# Patient Record
Sex: MALE | Race: WHITE | NOT HISPANIC OR LATINO | Employment: FULL TIME | URBAN - METROPOLITAN AREA
[De-identification: names, ages, dates, MRNs, and addresses within clinical notes are randomized per-mention and may not be internally consistent; named-entity substitution may affect disease eponyms.]

---

## 2017-05-18 DIAGNOSIS — Z94.81 S/P ALLOGENEIC BONE MARROW TRANSPLANT (H): ICD-10-CM

## 2017-05-18 DIAGNOSIS — R91.8 PULMONARY NODULES: ICD-10-CM

## 2017-05-18 DIAGNOSIS — D60.9 RED CELL APLASIA (H): ICD-10-CM

## 2017-05-18 LAB
ALBUMIN SERPL-MCNC: 4.2 G/DL (ref 3.4–5)
ALP SERPL-CCNC: 66 U/L (ref 40–150)
ALT SERPL W P-5'-P-CCNC: 31 U/L (ref 0–70)
ANION GAP SERPL CALCULATED.3IONS-SCNC: 9 MMOL/L (ref 3–14)
AST SERPL W P-5'-P-CCNC: 30 U/L (ref 0–45)
BASOPHILS # BLD AUTO: 0 10E9/L (ref 0–0.2)
BASOPHILS NFR BLD AUTO: 0.3 %
BILIRUB SERPL-MCNC: 2.8 MG/DL (ref 0.2–1.3)
BUN SERPL-MCNC: 14 MG/DL (ref 7–30)
CALCIUM SERPL-MCNC: 8.6 MG/DL (ref 8.5–10.1)
CHLORIDE SERPL-SCNC: 105 MMOL/L (ref 94–109)
CO2 SERPL-SCNC: 26 MMOL/L (ref 20–32)
CREAT SERPL-MCNC: 0.82 MG/DL (ref 0.66–1.25)
DIFFERENTIAL METHOD BLD: NORMAL
EOSINOPHIL # BLD AUTO: 0.1 10E9/L (ref 0–0.7)
EOSINOPHIL NFR BLD AUTO: 1.2 %
ERYTHROCYTE [DISTWIDTH] IN BLOOD BY AUTOMATED COUNT: 13.1 % (ref 10–15)
FERRITIN SERPL-MCNC: 732 NG/ML (ref 26–388)
GFR SERPL CREATININE-BSD FRML MDRD: ABNORMAL ML/MIN/1.7M2
GLUCOSE SERPL-MCNC: 96 MG/DL (ref 70–99)
HCT VFR BLD AUTO: 44.2 % (ref 40–53)
HGB BLD-MCNC: 15.6 G/DL (ref 13.3–17.7)
IMM GRANULOCYTES # BLD: 0 10E9/L (ref 0–0.4)
IMM GRANULOCYTES NFR BLD: 0.3 %
LYMPHOCYTES # BLD AUTO: 1.8 10E9/L (ref 0.8–5.3)
LYMPHOCYTES NFR BLD AUTO: 24.3 %
MCH RBC QN AUTO: 32 PG (ref 26.5–33)
MCHC RBC AUTO-ENTMCNC: 35.3 G/DL (ref 31.5–36.5)
MCV RBC AUTO: 91 FL (ref 78–100)
MONOCYTES # BLD AUTO: 0.7 10E9/L (ref 0–1.3)
MONOCYTES NFR BLD AUTO: 9.9 %
NEUTROPHILS # BLD AUTO: 4.7 10E9/L (ref 1.6–8.3)
NEUTROPHILS NFR BLD AUTO: 64 %
NRBC # BLD AUTO: 0 10*3/UL
NRBC BLD AUTO-RTO: 0 /100
PLATELET # BLD AUTO: 155 10E9/L (ref 150–450)
POTASSIUM SERPL-SCNC: 3.8 MMOL/L (ref 3.4–5.3)
PROT SERPL-MCNC: 7.6 G/DL (ref 6.8–8.8)
RBC # BLD AUTO: 4.88 10E12/L (ref 4.4–5.9)
SODIUM SERPL-SCNC: 141 MMOL/L (ref 133–144)
WBC # BLD AUTO: 7.4 10E9/L (ref 4–11)

## 2017-05-18 PROCEDURE — 80053 COMPREHEN METABOLIC PANEL: CPT | Performed by: INTERNAL MEDICINE

## 2017-05-18 PROCEDURE — 99212 OFFICE O/P EST SF 10 MIN: CPT | Mod: 25

## 2017-05-18 PROCEDURE — 82728 ASSAY OF FERRITIN: CPT | Performed by: INTERNAL MEDICINE

## 2017-05-18 PROCEDURE — 90471 IMMUNIZATION ADMIN: CPT

## 2017-05-18 PROCEDURE — 85025 COMPLETE CBC W/AUTO DIFF WBC: CPT | Performed by: INTERNAL MEDICINE

## 2017-05-19 ENCOUNTER — ONCOLOGY VISIT (OUTPATIENT)
Dept: TRANSPLANT | Facility: CLINIC | Age: 27
End: 2017-05-19
Attending: INTERNAL MEDICINE
Payer: COMMERCIAL

## 2017-05-19 VITALS
RESPIRATION RATE: 18 BRPM | OXYGEN SATURATION: 100 % | HEART RATE: 89 BPM | DIASTOLIC BLOOD PRESSURE: 72 MMHG | WEIGHT: 192.1 LBS | BODY MASS INDEX: 26.05 KG/M2 | TEMPERATURE: 98.3 F | SYSTOLIC BLOOD PRESSURE: 121 MMHG

## 2017-05-19 DIAGNOSIS — Z94.81 S/P ALLOGENEIC BONE MARROW TRANSPLANT (H): Primary | ICD-10-CM

## 2017-05-19 PROCEDURE — 25000125 ZZHC RX 250: Mod: ZF | Performed by: INTERNAL MEDICINE

## 2017-05-19 PROCEDURE — 90651 9VHPV VACCINE 2/3 DOSE IM: CPT | Mod: ZF | Performed by: INTERNAL MEDICINE

## 2017-05-19 RX ADMIN — HUMAN PAPILLOMAVIRUS 9-VALENT VACCINE, RECOMBINANT 0.5 ML: 30; 40; 60; 40; 20; 20; 20; 20; 20 INJECTION, SUSPENSION INTRAMUSCULAR at 12:32

## 2017-05-19 ASSESSMENT — PAIN SCALES - GENERAL: PAINLEVEL: NO PAIN (0)

## 2017-05-19 NOTE — PATIENT INSTRUCTIONS
He is now 2 yr from completing brentuximab; I will discuss with Dr Morrison follow up frequency from here on and give him a call with plan.  CT neck CAP w and w/o contrast in 6 months  Gardasil 2nd dose today; next dose in OCT 2017; 3rd dose on 10/20/17 in the system

## 2017-05-19 NOTE — PROGRESS NOTES
BMT Clinic Progress Note    Amadou is a 25 yo s/p NMA allo sib PBSCT for Hodgkin lymphoma, with relapsed disease, on brentuximab with complete response to therapy based on PET/CT findings. S/p 16 cycles of brentuximab completed MAY 2015.  CC: Follow-up     HPI:  Patient returns for follow-up. Resolved lung infiltrates. Still on vfend. Remains asymptomatic. Full time job going well. Got engaged. Eats well. Weight stable. No fevers or sweats. Usual runny nose. Reports neuropathy stable. Denied fever/chills. No night sweats. No weight loss.     ROS: 10 point ROS otherwise unremarkable other than what is noted in the HPI.   Physical Exam:      Vitals:    05/19/17 1136   BP: 121/72   BP Location: Left arm   Patient Position: Chair   Cuff Size: Adult Regular   Pulse: 89   Resp: 18   Temp: 98.3  F (36.8  C)   TempSrc: Oral   SpO2: 100%   Weight: 87.1 kg (192 lb 1.6 oz)     Wt Readings from Last 4 Encounters:   05/19/17 87.1 kg (192 lb 1.6 oz)   10/06/16 83.1 kg (183 lb 1.6 oz)   09/07/16 83.7 kg (184 lb 8.4 oz)   08/18/16 83.2 kg (183 lb 6.4 oz)     HEENT: PERRL, EOMI, MMM. Mouth little dry but no definite changes of GVHD.  Neck with no palpable nodes or masses. Range of motion seems intact.  Chest: CTAB  CVS: S1S2 RRR, no murmurs or gallops  Abdomen : soft non tender, no hepatosplenomegaly  EXt no edema  CNS: non focal  Ear with plugs of wax both sides  Lab Results   Component Value Date    WBC 7.4 05/18/2017    ANEU 4.7 05/18/2017    HGB 15.6 05/18/2017    HCT 44.2 05/18/2017     05/18/2017     05/18/2017    POTASSIUM 3.8 05/18/2017    CHLORIDE 105 05/18/2017    CO2 26 05/18/2017    GLC 96 05/18/2017    BUN 14 05/18/2017    CR 0.82 05/18/2017    MAG 1.8 04/10/2015    INR 1.14 01/22/2016     Lab Results   Component Value Date    AST 30 05/18/2017     Lab Results   Component Value Date    ALT 31 05/18/2017     Lab Results   Component Value Date    BILICONJ 0.0 03/11/2014      Lab Results   Component Value Date     BILITOTAL 2.8 05/18/2017     Lab Results   Component Value Date    ALBUMIN 4.2 05/18/2017     Lab Results   Component Value Date    PROTTOTAL 7.6 05/18/2017      Lab Results   Component Value Date    ALKPHOS 66 05/18/2017       Ferritin 700's    CT Neck, CAP w and w/o contrast Nov/16.  NO evidence of relapsed Hodgkin's lymphoma    Assessment and Plan:      Amadou is a 25yo with a h/o Hodgkins lymphoma, s/p NMA allo sib, relapsed; S/P brentuximab 16 cycles.     1. Hodgkin's lymphoma: in CR by PET after 16 of brentuximab completed in May 2015. Neuropathy unchanged.   2. BMT: s/p NMA allo sib PBSCT for Hodgkin lymphoma, protocol 2001-10. Previous auto PBSCT in August 2012. Relapsed 5/6/14. In CR as of Nov 2016 CT again as above.   3. Heme:  Hemosiderosis. Ferritin up to 700's. No need to restart phlebotomies at this time unless elevated ferritin raises again. Hemochromatosis testing shows for C282Y and S65C absent and heterozygous H63D Mutation. Exjade caused profound neutropenia when given during brentuximab therapy.   4. ID: no acitve infectious issues. Flu shot in the fall when available. today. Also got vaccines for Pnemococcus, MMR, varicella. Gardasil 2nd dose today, 3rd in OCT 17 in [facility].   - completed vaccination today, gardasil was 2nd dose. IgG and CD4 counts were good in sep 2015  5. Liver: normal LFts. Off phlebotomies.  - Gilbert's: Intermittently has elevated bilirubin. Bilirubin slightly elevated today  6. FEN: Creat, lytes wnl. Eating well.   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 Discontinued Gengraf 2/11/14  8. Arachnoid cyst: accidental finding; likely benign; following with neurosurgery    Plan:   He is now 2 yr from completing brentuximab; I will discuss with Dr Morrison follow up frequency from here on and give him a call with plan.  CT neck CAP w and w/o contrast in 6 months  Gardasil 2nd dose today; next dose in OCT 2017; 3rd dose on 10/20/17 in the system.

## 2017-05-19 NOTE — NURSING NOTE
Oncology Rooming Note    May 19, 2017 11:44 AM   Amadou Landrum is a 26 year old male who presents for:    Chief Complaint   Patient presents with     Oncology Clinic Visit     Patient with Hodgkin's disease here for provider visit      Initial Vitals: /72 (BP Location: Left arm, Patient Position: Chair, Cuff Size: Adult Regular)  Pulse 89  Temp 98.3  F (36.8  C) (Oral)  Resp 18  Wt 87.1 kg (192 lb 1.6 oz)  SpO2 100%  BMI 26.05 kg/m2 Estimated body mass index is 26.05 kg/(m^2) as calculated from the following:    Height as of 8/18/16: 1.829 m (6').    Weight as of this encounter: 87.1 kg (192 lb 1.6 oz). Body surface area is 2.1 meters squared.  No Pain (0) Comment: Data Unavailable   No LMP for male patient.  Allergies reviewed: Yes  Medications reviewed: Yes    Medications: Medication refills not needed today.  Pharmacy name entered into [employer]:    [facility] - [place], [place] - [address] [id number] - [place], [place] - [address]     Clinical concerns:   5 minutes for nursing intake (face to face time)     Kimberly French CMA

## 2017-05-19 NOTE — LETTER
5/19/2017      RE: Amadou Landrum  [address] [place] [address]       BMT Clinic Progress Note    Amadou is a 23 yo s/p NMA allo sib PBSCT for Hodgkin lymphoma, with relapsed disease, on brentuximab with complete response to therapy based on PET/CT findings. S/p 16 cycles of brentuximab completed MAY 2015.  CC: Follow-up     HPI:  Patient returns for follow-up. Resolved lung infiltrates. Still on vfend. Remains asymptomatic. Full time job going well. Got engaged. Eats well. Weight stable. No fevers or sweats. Usual runny nose. Reports neuropathy stable. Denied fever/chills. No night sweats. No weight loss.     ROS: 10 point ROS otherwise unremarkable other than what is noted in the HPI.   Physical Exam:      Vitals:    05/19/17 1136   BP: 121/72   BP Location: Left arm   Patient Position: Chair   Cuff Size: Adult Regular   Pulse: 89   Resp: 18   Temp: 98.3  F (36.8  C)   TempSrc: Oral   SpO2: 100%   Weight: 87.1 kg (192 lb 1.6 oz)     Wt Readings from Last 4 Encounters:   05/19/17 87.1 kg (192 lb 1.6 oz)   10/06/16 83.1 kg (183 lb 1.6 oz)   09/07/16 83.7 kg (184 lb 8.4 oz)   08/18/16 83.2 kg (183 lb 6.4 oz)     HEENT: PERRL, EOMI, MMM. Mouth little dry but no definite changes of GVHD.  Neck with no palpable nodes or masses. Range of motion seems intact.  Chest: CTAB  CVS: S1S2 RRR, no murmurs or gallops  Abdomen : soft non tender, no hepatosplenomegaly  EXt no edema  CNS: non focal  Ear with plugs of wax both sides  Lab Results   Component Value Date    WBC 7.4 05/18/2017    ANEU 4.7 05/18/2017    HGB 15.6 05/18/2017    HCT 44.2 05/18/2017     05/18/2017     05/18/2017    POTASSIUM 3.8 05/18/2017    CHLORIDE 105 05/18/2017    CO2 26 05/18/2017    GLC 96 05/18/2017    BUN 14 05/18/2017    CR 0.82 05/18/2017    MAG 1.8 04/10/2015    INR 1.14 01/22/2016     Lab Results   Component Value Date    AST 30 05/18/2017     Lab Results   Component Value Date    ALT 31 05/18/2017     Lab Results    Component Value Date    BILICONJ 0.0 03/11/2014      Lab Results   Component Value Date    BILITOTAL 2.8 05/18/2017     Lab Results   Component Value Date    ALBUMIN 4.2 05/18/2017     Lab Results   Component Value Date    PROTTOTAL 7.6 05/18/2017      Lab Results   Component Value Date    ALKPHOS 66 05/18/2017       Ferritin 700's    CT Neck, CAP w and w/o contrast Nov/16.  NO evidence of relapsed Hodgkin's lymphoma    Assessment and Plan:      Amadou is a 25yo with a h/o Hodgkins lymphoma, s/p NMA allo sib, relapsed; S/P brentuximab 16 cycles.     1. Hodgkin's lymphoma: in CR by PET after 16 of brentuximab completed in May 2015. Neuropathy unchanged.   2. BMT: s/p NMA allo sib PBSCT for Hodgkin lymphoma, protocol 2001-10. Previous auto PBSCT in August 2012. Relapsed 5/6/14. In CR as of Nov 2016 CT again as above.   3. Heme:  Hemosiderosis. Ferritin up to 700's. No need to restart phlebotomies at this time unless elevated ferritin raises again. Hemochromatosis testing shows for C282Y and S65C absent and heterozygous H63D Mutation. Exjade caused profound neutropenia when given during brentuximab therapy.   4. ID: no acitve infectious issues. Flu shot in the fall when available. today. Also got vaccines for Pnemococcus, MMR, varicella. Gardasil 2nd dose today, 3rd in OCT 17 in [facility].   - completed vaccination today, gardasil was 2nd dose. IgG and CD4 counts were good in sep 2015  5. Liver: normal LFts. Off phlebotomies.  - Gilbert's: Intermittently has elevated bilirubin. Bilirubin slightly elevated today  6. FEN: Creat, lytes wnl. Eating well.   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 Discontinued Gengraf 2/11/14  8. Arachnoid cyst: accidental finding; likely benign; following with neurosurgery    Plan:   He is now 2 yr from completing brentuximab; I will discuss with Dr Morrison follow up frequency from here on and give him a call with plan.  CT neck CAP w and w/o contrast in 6 months  Gardasil 2nd dose today; next  dose in OCT 2017; 3rd dose on 10/20/17 in the system.     Sage Orellana MD

## 2017-05-19 NOTE — MR AVS SNAPSHOT
After Visit Summary   5/19/2017    Amadou Landrum    MRN: [id number]           Patient Information     Date Of Birth          1990        Visit Information        Provider Department      5/19/2017 11:30 AM Sage Orellana MD [facility] Blood and Marrow Transplant        Today's Diagnoses     S/P allogeneic bone marrow transplant (H)    -  1          [facility] [place] [place]  [address] [place] [address]  Phone: [phone]  Clinic Hours:   Monday-Thursday: 7am to 7pm   Friday: 7am to 5:30pm   Weekends and holidays:    8am to noon (in general)  If your fever is 100.5  or greater,   call the clinic.  After hours call the   hospital at [phone] or   [phone]. Ask for the BMT   fellow on-call           Care Instructions    He is now 2 yr from completing brentuximab; I will discuss with Dr Morrison follow up frequency from here on and give him a call with plan.  CT neck CAP w and w/o contrast in 6 months  Gardasil 2nd dose today; next dose in OCT 2017; 3rd dose on 10/20/17 in the system        Follow-ups after your visit        Who to contact     If you have questions or need follow up information about today's clinic visit or your schedule please contact [facility] directly at [phone].  Normal or non-critical lab and imaging results will be communicated to you by [employer], letter or phone within 4 business days after the clinic has received the results. If you do not hear from us within 7 days, please contact the clinic through [employer] or phone. If you have a critical or abnormal lab result, we will notify you by phone as soon as possible.  Submit refill requests through [email or web address] or call your pharmacy and they will forward the refill request to us. Please allow 3 business days for your refill to be completed.          Additional Information About Your Visit        [employer] Information     [email or web address] gives you secure access to your  electronic health record. If you see a primary care provider, you can also send messages to your care team and make appointments. If you have questions, please call your primary care clinic.  If you do not have a primary care provider, please call [phone] and they will assist you.        Care EveryWhere ID     This is your Care EveryWhere ID. This could be used by other organizations to access your [facility] medical records  [id number]        Your Vitals Were     Pulse Temperature Respirations Pulse Oximetry BMI (Body Mass Index)       89 98.3  F (36.8  C) (Oral) 18 100% 26.05 kg/m2        Blood Pressure from Last 3 Encounters:   05/19/17 121/72   10/06/16 116/77   09/07/16 114/59    Weight from Last 3 Encounters:   05/19/17 87.1 kg (192 lb 1.6 oz)   10/06/16 83.1 kg (183 lb 1.6 oz)   09/07/16 83.7 kg (184 lb 8.4 oz)              Today, you had the following     No orders found for display       Recent Review Flowsheet Data     BMT Recent Results Latest Ref Rng & Units 6/23/2016 8/8/2016 9/7/2016 11/18/2016 12/2/2016 12/19/2016 5/18/2017    WBC 4.0 - 11.0 10e9/L 5.3 5.8 7.1 6.1 - - 7.4    Hemoglobin 13.3 - 17.7 g/dL 14.9 13.6 16.3 16.3 - - 15.6    Platelet Count 150 - 450 10e9/L 169 132(L) 148(L) 158 - - 155    Neutrophils (Absolute) 1.6 - 8.3 10e9/L 3.3 3.7 4.8 3.8 - - 4.7    INR 0.86 - 1.14 - - - - - - -    Sodium 133 - 144 mmol/L - 137 141 142 - - 141    Potassium 3.4 - 5.3 mmol/L - 3.6 4.1 3.8 - - 3.8    Chloride 94 - 109 mmol/L - 104 104 106 - - 105    Glucose 70 - 99 mg/dL - 94 95 85 - - 96    Urea Nitrogen 7 - 30 mg/dL - 10 11 13 - - 14    Creatinine 0.66 - 1.25 mg/dL - 0.91 0.98 0.96 - - 0.82    Calcium (Total) 8.5 - 10.1 mg/dL - 8.3(L) 9.2 9.0 - - 8.6    Protein (Total) 6.8 - 8.8 g/dL - 6.4(L) 7.7 7.9 7.6 7.6 7.6    Albumin 3.4 - 5.0 g/dL - 3.7 4.3 4.1 4.0 4.0 4.2    Bilirubin (Direct) 0.0 - 0.2 mg/dL - - - - 0.3(H) 0.4(H) -    Alkaline Phosphatase 40 - 150 U/L - 57 70 75 83 62 66    AST 0 - 45 U/L -  26 43 106(H) 99(H) 88(H) 30    ALT 0 - 70 U/L - 35 65 194(H) 182(H) 158(H) 31    MCV 78 - 100 fl 92 93 91 91 - - 91               Primary Care Provider Office Phone # Fax #    Dorothy Mojica [phone]       [facility] [address] [place] [address]        Thank you!     Thank you for choosing [facility] MARROW TRANSPL[facility]  for your care. Our goal is always to provide you with excellent care. Hearing back from our patients is one way we can continue to improve our services. Please take a few minutes to complete the written survey that you may receive in the mail after your visit with us. Thank you!             Your Updated Medication List - Protect others around you: Learn how to safely use, store and throw away your medicines at www.disposemymeds.org.      Notice  As of 5/19/2017  1:05 PM    You have not been prescribed any medications.

## 2017-10-30 DIAGNOSIS — Z94.84 STEM CELLS TRANSPLANT STATUS (H): ICD-10-CM

## 2017-10-30 DIAGNOSIS — Z94.81 S/P ALLOGENEIC BONE MARROW TRANSPLANT (H): ICD-10-CM

## 2017-10-30 DIAGNOSIS — C81.90 HODGKIN'S LYMPHOMA (H): Primary | ICD-10-CM

## 2017-11-30 NOTE — NURSING NOTE
Chief Complaint   Patient presents with     Lab Only     no lab orders available so no labs drawn today.     No lab orders available--sent text page to provider asking him to place orders as pt has appt tomorrow.  Spoke to CT--they will start IV.  Tania Asencio RN

## 2017-12-01 ENCOUNTER — ONCOLOGY VISIT (OUTPATIENT)
Dept: TRANSPLANT | Facility: CLINIC | Age: 27
End: 2017-12-01
Attending: INTERNAL MEDICINE
Payer: COMMERCIAL

## 2017-12-01 VITALS
SYSTOLIC BLOOD PRESSURE: 143 MMHG | HEART RATE: 87 BPM | DIASTOLIC BLOOD PRESSURE: 80 MMHG | TEMPERATURE: 97.4 F | OXYGEN SATURATION: 100 % | RESPIRATION RATE: 18 BRPM | BODY MASS INDEX: 25.95 KG/M2 | WEIGHT: 191.36 LBS

## 2017-12-01 DIAGNOSIS — Z94.84 STEM CELLS TRANSPLANT STATUS (H): Primary | ICD-10-CM

## 2017-12-01 LAB
ALBUMIN SERPL-MCNC: 4.2 G/DL (ref 3.4–5)
ALP SERPL-CCNC: 66 U/L (ref 40–150)
ALT SERPL W P-5'-P-CCNC: 30 U/L (ref 0–70)
ANION GAP SERPL CALCULATED.3IONS-SCNC: 7 MMOL/L (ref 3–14)
AST SERPL W P-5'-P-CCNC: 24 U/L (ref 0–45)
BASOPHILS # BLD AUTO: 0 10E9/L (ref 0–0.2)
BASOPHILS NFR BLD AUTO: 0.3 %
BILIRUB SERPL-MCNC: 1.6 MG/DL (ref 0.2–1.3)
BUN SERPL-MCNC: 10 MG/DL (ref 7–30)
CALCIUM SERPL-MCNC: 9.5 MG/DL (ref 8.5–10.1)
CHLORIDE SERPL-SCNC: 103 MMOL/L (ref 94–109)
CO2 SERPL-SCNC: 29 MMOL/L (ref 20–32)
CREAT SERPL-MCNC: 0.91 MG/DL (ref 0.66–1.25)
DIFFERENTIAL METHOD BLD: NORMAL
EOSINOPHIL # BLD AUTO: 0.1 10E9/L (ref 0–0.7)
EOSINOPHIL NFR BLD AUTO: 0.9 %
ERYTHROCYTE [DISTWIDTH] IN BLOOD BY AUTOMATED COUNT: 12.8 % (ref 10–15)
FERRITIN SERPL-MCNC: 858 NG/ML (ref 26–388)
GFR SERPL CREATININE-BSD FRML MDRD: >90 ML/MIN/1.7M2
GLUCOSE SERPL-MCNC: 94 MG/DL (ref 70–99)
HCT VFR BLD AUTO: 45.9 % (ref 40–53)
HGB BLD-MCNC: 15.8 G/DL (ref 13.3–17.7)
IMM GRANULOCYTES # BLD: 0 10E9/L (ref 0–0.4)
IMM GRANULOCYTES NFR BLD: 0.3 %
LYMPHOCYTES # BLD AUTO: 1.3 10E9/L (ref 0.8–5.3)
LYMPHOCYTES NFR BLD AUTO: 14.9 %
MCH RBC QN AUTO: 30.9 PG (ref 26.5–33)
MCHC RBC AUTO-ENTMCNC: 34.4 G/DL (ref 31.5–36.5)
MCV RBC AUTO: 90 FL (ref 78–100)
MONOCYTES # BLD AUTO: 0.8 10E9/L (ref 0–1.3)
MONOCYTES NFR BLD AUTO: 9.2 %
NEUTROPHILS # BLD AUTO: 6.7 10E9/L (ref 1.6–8.3)
NEUTROPHILS NFR BLD AUTO: 74.4 %
NRBC # BLD AUTO: 0 10*3/UL
NRBC BLD AUTO-RTO: 0 /100
PLATELET # BLD AUTO: 176 10E9/L (ref 150–450)
POTASSIUM SERPL-SCNC: 3.7 MMOL/L (ref 3.4–5.3)
PROT SERPL-MCNC: 8.4 G/DL (ref 6.8–8.8)
RBC # BLD AUTO: 5.11 10E12/L (ref 4.4–5.9)
SODIUM SERPL-SCNC: 139 MMOL/L (ref 133–144)
WBC # BLD AUTO: 9 10E9/L (ref 4–11)

## 2017-12-01 PROCEDURE — G0008 ADMIN INFLUENZA VIRUS VAC: HCPCS

## 2017-12-01 PROCEDURE — 90651 9VHPV VACCINE 2/3 DOSE IM: CPT | Mod: ZF | Performed by: INTERNAL MEDICINE

## 2017-12-01 PROCEDURE — 90686 IIV4 VACC NO PRSV 0.5 ML IM: CPT | Mod: ZF | Performed by: INTERNAL MEDICINE

## 2017-12-01 PROCEDURE — 36415 COLL VENOUS BLD VENIPUNCTURE: CPT

## 2017-12-01 PROCEDURE — 82728 ASSAY OF FERRITIN: CPT | Performed by: INTERNAL MEDICINE

## 2017-12-01 PROCEDURE — 80053 COMPREHEN METABOLIC PANEL: CPT | Performed by: INTERNAL MEDICINE

## 2017-12-01 PROCEDURE — 90472 IMMUNIZATION ADMIN EACH ADD: CPT

## 2017-12-01 PROCEDURE — 99212 OFFICE O/P EST SF 10 MIN: CPT | Mod: ZF

## 2017-12-01 PROCEDURE — 25000125 ZZHC RX 250: Mod: ZF | Performed by: INTERNAL MEDICINE

## 2017-12-01 PROCEDURE — 85025 COMPLETE CBC W/AUTO DIFF WBC: CPT | Performed by: INTERNAL MEDICINE

## 2017-12-01 PROCEDURE — 25000128 H RX IP 250 OP 636: Mod: ZF | Performed by: INTERNAL MEDICINE

## 2017-12-01 RX ADMIN — HUMAN PAPILLOMAVIRUS 9-VALENT VACCINE, RECOMBINANT 0.5 ML: 30; 40; 60; 40; 20; 20; 20; 20; 20 INJECTION, SUSPENSION INTRAMUSCULAR at 13:35

## 2017-12-01 RX ADMIN — INFLUENZA A VIRUS A/MICHIGAN/45/2015 X-275 (H1N1) ANTIGEN (FORMALDEHYDE INACTIVATED), INFLUENZA A VIRUS A/HONG KONG/4801/2014 X-263B (H3N2) ANTIGEN (FORMALDEHYDE INACTIVATED), INFLUENZA B VIRUS B/PHUKET/3073/2013 ANTIGEN (FORMALDEHYDE INACTIVATED), AND INFLUENZA B VIRUS B/BRISBANE/60/2008 ANTIGEN (FORMALDEHYDE INACTIVATED) 0.5 ML: 15; 15; 15; 15 INJECTION, SUSPENSION INTRAMUSCULAR at 13:35

## 2017-12-01 ASSESSMENT — PAIN SCALES - GENERAL: PAINLEVEL: NO PAIN (0)

## 2017-12-01 NOTE — PATIENT INSTRUCTIONS
Plan:   He is now 2.5 yr from completing brentuximab; next visit we will discuss discontinuing imaging.  Flu shot today 12/01/17  Gardasil 3rd dose 12/01/17 in the system.   RTC Reyna on 6/1/18 with labs and CT neck CAP and Neck w/ and w/o contrast; scans the day prior to visit.  With primary care needs to screen for skin and oral cancer at least yearly, needs to yearly eye exams, thyroid function, cholesterol, CAD, bone health (vit D and calcium). Dentist visit at least once yearly.

## 2017-12-01 NOTE — PROGRESS NOTES
BMT Clinic Progress Note    Amadou is a 25 yo s/p NMA allo sib PBSCT for Hodgkin lymphoma, with relapsed disease, on brentuximab with complete response to therapy based on PET/CT findings. S/p 16 cycles of brentuximab completed MAY 2015.  CC: Follow-up     HPI:  Patient returns for follow-up. He has bee doing very well. Working hard and got  in August 2017. Eats well. Weight stable. No fevers or sweats. Usual runny nose. Reports neuropathy stable. Denied fever/chills. No night sweats. No weight loss.     ROS: 10 point ROS otherwise unremarkable other than what is noted in the HPI.   Physical Exam:      Vitals:    12/01/17 1228   BP: 143/80   Pulse: 87   Resp: 18   Temp: 97.4  F (36.3  C)   TempSrc: Oral   SpO2: 100%   Weight: 86.8 kg (191 lb 5.8 oz)     Wt Readings from Last 4 Encounters:   12/01/17 86.8 kg (191 lb 5.8 oz)   05/19/17 87.1 kg (192 lb 1.6 oz)   10/06/16 83.1 kg (183 lb 1.6 oz)   09/07/16 83.7 kg (184 lb 8.4 oz)     HEENT: PERRL, EOMI, MMM. Mouth little dry but no definite changes of GVHD.  Neck with no palpable nodes or masses. Range of motion seems intact.  Chest: CTAB  CVS: S1S2 RRR, no murmurs or gallops  Abdomen : soft non tender, no hepatosplenomegaly  EXt no edema warm good perfusion  CNS: non focal      Lab Results   Component Value Date    WBC 9.0 12/01/2017    ANEU 6.7 12/01/2017    HGB 15.8 12/01/2017    HCT 45.9 12/01/2017     12/01/2017     12/01/2017    POTASSIUM 3.7 12/01/2017    CHLORIDE 103 12/01/2017    CO2 29 12/01/2017    GLC 94 12/01/2017    BUN 10 12/01/2017    CR 0.91 12/01/2017    MAG 1.8 04/10/2015    INR 1.14 01/22/2016    BILITOTAL 1.6 (H) 12/01/2017    AST 24 12/01/2017    ALT 30 12/01/2017    ALKPHOS 66 12/01/2017    PROTTOTAL 8.4 12/01/2017    ALBUMIN 4.2 12/01/2017     Ferritin pending.     CT SOFT TISSUE NECK W CONTRAST 11/30/2017 5:53 PM     History: Status post NMA allo sib PBSCT for Hodgkin lymphoma in 2012,  with relapsed disease, on brentuximab  with complete response to  therapy based on PET/CT findings. S/p 16 cycles of brentuximab  completed MAY 2015.  ICD-10: Hodgkin's lymphoma (H); Stem cells transplant status (H); S/P  allogeneic bone marrow transplant (H)      Comparison:  5/18/2017 CT soft tissue neck with contrast, 12/8/2012 CT  head and neck PET     Technique: Following intravenous administration of nonionic iodinated  contrast medium, thin section helical CT images were obtained from the  skull base down to the level of the aortic arch.  Axial, coronal and  sagittal reformations were performed with 2-3 mm slice thickness  reconstruction. Images were reviewed in soft tissue, lung and bone      Contrast: Isovue 370 118cc      Findings:   Several images are degraded by artifact related to patient motion,  particularly of the upper neck and skull base.     Evaluation of the mucosal space demonstrates no evident abnormality in  the nasopharynx, oropharynx, hypopharynx or the glottis. The tongue  base appears normal. The major salivary glands appear unremarkable.  The thyroid gland appears normal.     There is no evident cervical lymphadenopathy. The fascial spaces in  the neck are intact bilaterally. The major vascular structures in the  neck are unremarkable.     Evaluation of the osseous structures demonstrate no worrisome lytic or  sclerotic lesion. No overt spinal canal or neuroforaminal stenosis.  There is mild polypoid mucosal thickening in the left maxillary sinus.  The mastoid air cells are clear.      Unchanged posterior fossa arachnoid cyst versus jill cisterna magna.     Mild biapical pulmonary scarring, unchanged.      Impression:  Mildly motion degraded examination without evident mass or adenopathy  within the neck.      CT CHEST/ABDOMEN/PELVIS W CONTRAST  11/30/2017 5:53 PM       CLINICAL HISTORY: ; Hodgkin's lymphoma (H); Stem cells transplant  status (H); S/P allogeneic bone marrow transplant (H)     COMPARISON: 5/18/2017.          PROCEDURE COMMENTS: CT of the chest, abdomen, and pelvis was performed  with Isovue 370 118cc intravenous and oral contrast. Axial MIP  images  of the chest, and coronal and sagittal reformatted images of the  chest, abdomen, and pelvis obtained.     FINDINGS:    Support devices: None.     Chest:  Again noted is mild biapical scarring. Visualized thyroid parenchyma  is unremarkable. Aortic origin of the left vertebral artery. The heart  is not enlarged. No main or lobar pulmonary embolus. No pericardial or  pleural effusion. Stable 1 cm right hilar lymph node (series 8, image  116). No axillary or mediastinal lymphadenopathy.     Areas of peribronchial vascular in the medial segment right middle  lobe and right lower lobe, stable since 2015. Calcified granuloma  within the right upper lobe (series 11, image 58). The trachea and  central airways are clear. 4 mm right middle lobe pulmonary nodule  (series 11, image 103). This nodule is stable since at least 9/15/2015  and is therefore statistically benign. Additional nodular opacities,  for example in the left major fissure (series 11, image 60), also  stable. No new or enlarging pulmonary nodules.     Abdomen/pelvis:  The spleen is mildly enlarged, measuring approximately 14.7 cm in  craniocaudal dimension. No significant change from present study.  Focal steatosis adjacent to the fossa for ligamentum teres.No arterial  enhancing hepatic lesions. Otherwise, the liver and biliary system,  spleen, pancreas are normal in appearance. The adrenal glands and  kidneys are normal in appearance.  There are no abnormally dilated or  thickened loops of small bowel or colon.  There is no free air or free  fluid. There are no abnormally sized lymph nodes.     Bones:   Large posterior osteophytes of the superior endplate of L3 vertebral  body. No acute fracture or aggressive-appearing osseous lesion.         IMPRESSION: In this patient with history of Hodgkin's  lymphoma:  Persistent mild splenomegaly. Aside from the unchanged, borderline  enlarged right hilar lymph node, no suspicious lymphadenopathy within  the chest, abdomen, or pelvis.    Assessment and Plan:      Amadou is a 23yo with a h/o Hodgkins lymphoma, s/p NMA allo sib, relapsed; S/P brentuximab 16 cycles.     1. Hodgkin's lymphoma: CTs showed continued CR. Discussed stopped imaging. 16 of brentuximab completed in May 2015. Neuropathy unchanged.   2. BMT: s/p NMA allo sib PBSCT for Hodgkin lymphoma, protocol 2001-10. Previous auto PBSCT in August 2012. Relapsed 5/6/14. In CR as of Nov 2016 CT again as above.   3. Heme:  Hemosiderosis. Ferritin up to 700's.  Repeat ferritin pending. Hemochromatosis testing shows for C282Y and S65C absent and heterozygous H63D Mutation. Exjade caused profound neutropenia when given during brentuximab therapy.   4. ID: no acitve infectious issues. Flu shot in the fall when available. today. Also got vaccines for Pnemococcus, MMR, varicella. Gardasil 2nd dose today, 3rd in OCT 17 in [facility].   - completed vaccination today, gardasil was 3rd dose today, flu shot today. IgG and CD4 counts were good in Sep 2015  5. Liver: normal LFts. Off phlebotomies.  - Gilbert's: Intermittently has elevated bilirubin. Bilirubin slightly elevated today  6. FEN: Creat, lytes wnl. Eating well.   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 Discontinued Gengraf 2/11/14  8. Arachnoid cyst: accidental finding; likely benign; following with neurosurgery    Plan:   He is now 2.5 yr from completing brentuximab; next visit we will discuss discontinuing imaging.  Flu shot today 12/01/17  Gardasil 3rd dose 12/01/17 in the system.   RTC Reyna on 6/1/18 with labs and CT neck CAP and Neck w/ and w/o contrast; scans the day prior to visit. All orders in [facility].  With primary care needs to screen for skin and oral cancer at least yearly, needs to yearly eye exams, thyroid function, cholesterol, CAD, bone health (vit D and  calcium). Dentist visit at least once yearly.

## 2017-12-01 NOTE — NURSING NOTE
Oncology Rooming Note    December 1, 2017 12:32 PM   Amadou Landrum is a 27 year old male who presents for:    Chief Complaint   Patient presents with     RECHECK     provider visit, labs s/p bmt txp for Hodgkin's lymphoma     Initial Vitals: /80  Pulse 87  Temp 97.4  F (36.3  C) (Oral)  Resp 18  Wt 86.8 kg (191 lb 5.8 oz)  SpO2 100%  BMI 25.95 kg/m2 Estimated body mass index is 25.95 kg/(m^2) as calculated from the following:    Height as of 8/18/16: 1.829 m (6').    Weight as of this encounter: 86.8 kg (191 lb 5.8 oz). Body surface area is 2.1 meters squared.  No Pain (0) Comment: Data Unavailable   No LMP for male patient.  Allergies reviewed: Yes  Medications reviewed: Yes    Medications: Medication refills not needed today.  Pharmacy name entered into [employer]:    [facility] - [place], [place] - [address] [id number] - [place], [place] - [address]     Labs drawn by venipuncture by RN.     5 minutes for nursing intake (face to face time)     JEFRY CARTER RN

## 2017-12-01 NOTE — LETTER
12/1/2017      RE: Amadou Landrum  [address] [place] [address]       BMT Clinic Progress Note    Amadou is a 25 yo s/p NMA allo sib PBSCT for Hodgkin lymphoma, with relapsed disease, on brentuximab with complete response to therapy based on PET/CT findings. S/p 16 cycles of brentuximab completed MAY 2015.  CC: Follow-up     HPI:  Patient returns for follow-up. He has bee doing very well. Working hard and got  in August 2017. Eats well. Weight stable. No fevers or sweats. Usual runny nose. Reports neuropathy stable. Denied fever/chills. No night sweats. No weight loss.     ROS: 10 point ROS otherwise unremarkable other than what is noted in the HPI.   Physical Exam:      Vitals:    12/01/17 1228   BP: 143/80   Pulse: 87   Resp: 18   Temp: 97.4  F (36.3  C)   TempSrc: Oral   SpO2: 100%   Weight: 86.8 kg (191 lb 5.8 oz)     Wt Readings from Last 4 Encounters:   12/01/17 86.8 kg (191 lb 5.8 oz)   05/19/17 87.1 kg (192 lb 1.6 oz)   10/06/16 83.1 kg (183 lb 1.6 oz)   09/07/16 83.7 kg (184 lb 8.4 oz)     HEENT: PERRL, EOMI, MMM. Mouth little dry but no definite changes of GVHD.  Neck with no palpable nodes or masses. Range of motion seems intact.  Chest: CTAB  CVS: S1S2 RRR, no murmurs or gallops  Abdomen : soft non tender, no hepatosplenomegaly  EXt no edema warm good perfusion  CNS: non focal      Lab Results   Component Value Date    WBC 9.0 12/01/2017    ANEU 6.7 12/01/2017    HGB 15.8 12/01/2017    HCT 45.9 12/01/2017     12/01/2017     12/01/2017    POTASSIUM 3.7 12/01/2017    CHLORIDE 103 12/01/2017    CO2 29 12/01/2017    GLC 94 12/01/2017    BUN 10 12/01/2017    CR 0.91 12/01/2017    MAG 1.8 04/10/2015    INR 1.14 01/22/2016    BILITOTAL 1.6 (H) 12/01/2017    AST 24 12/01/2017    ALT 30 12/01/2017    ALKPHOS 66 12/01/2017    PROTTOTAL 8.4 12/01/2017    ALBUMIN 4.2 12/01/2017     Ferritin pending.     CT SOFT TISSUE NECK W CONTRAST 11/30/2017 5:53 PM     History: Status post NMA  allo sib PBSCT for Hodgkin lymphoma in 2012,  with relapsed disease, on brentuximab with complete response to  therapy based on PET/CT findings. S/p 16 cycles of brentuximab  completed MAY 2015.  ICD-10: Hodgkin's lymphoma (H); Stem cells transplant status (H); S/P  allogeneic bone marrow transplant (H)      Comparison:  5/18/2017 CT soft tissue neck with contrast, 12/8/2012 CT  head and neck PET     Technique: Following intravenous administration of nonionic iodinated  contrast medium, thin section helical CT images were obtained from the  skull base down to the level of the aortic arch.  Axial, coronal and  sagittal reformations were performed with 2-3 mm slice thickness  reconstruction. Images were reviewed in soft tissue, lung and bone      Contrast: Isovue 370 118cc      Findings:   Several images are degraded by artifact related to patient motion,  particularly of the upper neck and skull base.     Evaluation of the mucosal space demonstrates no evident abnormality in  the nasopharynx, oropharynx, hypopharynx or the glottis. The tongue  base appears normal. The major salivary glands appear unremarkable.  The thyroid gland appears normal.     There is no evident cervical lymphadenopathy. The fascial spaces in  the neck are intact bilaterally. The major vascular structures in the  neck are unremarkable.     Evaluation of the osseous structures demonstrate no worrisome lytic or  sclerotic lesion. No overt spinal canal or neuroforaminal stenosis.  There is mild polypoid mucosal thickening in the left maxillary sinus.  The mastoid air cells are clear.      Unchanged posterior fossa arachnoid cyst versus jill cisterna magna.     Mild biapical pulmonary scarring, unchanged.      Impression:  Mildly motion degraded examination without evident mass or adenopathy  within the neck.      CT CHEST/ABDOMEN/PELVIS W CONTRAST  11/30/2017 5:53 PM       CLINICAL HISTORY: ; Hodgkin's lymphoma (H); Stem cells transplant  status  (H); S/P allogeneic bone marrow transplant (H)     COMPARISON: 5/18/2017.         PROCEDURE COMMENTS: CT of the chest, abdomen, and pelvis was performed  with Isovue 370 118cc intravenous and oral contrast. Axial MIP  images  of the chest, and coronal and sagittal reformatted images of the  chest, abdomen, and pelvis obtained.     FINDINGS:    Support devices: None.     Chest:  Again noted is mild biapical scarring. Visualized thyroid parenchyma  is unremarkable. Aortic origin of the left vertebral artery. The heart  is not enlarged. No main or lobar pulmonary embolus. No pericardial or  pleural effusion. Stable 1 cm right hilar lymph node (series 8, image  116). No axillary or mediastinal lymphadenopathy.     Areas of peribronchial vascular in the medial segment right middle  lobe and right lower lobe, stable since 2015. Calcified granuloma  within the right upper lobe (series 11, image 58). The trachea and  central airways are clear. 4 mm right middle lobe pulmonary nodule  (series 11, image 103). This nodule is stable since at least 9/15/2015  and is therefore statistically benign. Additional nodular opacities,  for example in the left major fissure (series 11, image 60), also  stable. No new or enlarging pulmonary nodules.     Abdomen/pelvis:  The spleen is mildly enlarged, measuring approximately 14.7 cm in  craniocaudal dimension. No significant change from present study.  Focal steatosis adjacent to the fossa for ligamentum teres.No arterial  enhancing hepatic lesions. Otherwise, the liver and biliary system,  spleen, pancreas are normal in appearance. The adrenal glands and  kidneys are normal in appearance.  There are no abnormally dilated or  thickened loops of small bowel or colon.  There is no free air or free  fluid. There are no abnormally sized lymph nodes.     Bones:   Large posterior osteophytes of the superior endplate of L3 vertebral  body. No acute fracture or aggressive-appearing osseous  lesion.         IMPRESSION: In this patient with history of Hodgkin's lymphoma:  Persistent mild splenomegaly. Aside from the unchanged, borderline  enlarged right hilar lymph node, no suspicious lymphadenopathy within  the chest, abdomen, or pelvis.    Assessment and Plan:      Amadou is a 23yo with a h/o Hodgkins lymphoma, s/p NMA allo sib, relapsed; S/P brentuximab 16 cycles.     1. Hodgkin's lymphoma: CTs showed continued CR. Discussed stopped imaging. 16 of brentuximab completed in May 2015. Neuropathy unchanged.   2. BMT: s/p NMA allo sib PBSCT for Hodgkin lymphoma, protocol 2001-10. Previous auto PBSCT in August 2012. Relapsed 5/6/14. In CR as of Nov 2016 CT again as above.   3. Heme:  Hemosiderosis. Ferritin up to 700's.  Repeat ferritin pending. Hemochromatosis testing shows for C282Y and S65C absent and heterozygous H63D Mutation. Exjade caused profound neutropenia when given during brentuximab therapy.   4. ID: no acitve infectious issues. Flu shot in the fall when available. today. Also got vaccines for Pnemococcus, MMR, varicella. Gardasil 2nd dose today, 3rd in OCT 17 in [place].   - completed vaccination today, gardasil was 3rd dose today, flu shot today. IgG and CD4 counts were good in Sep 2015  5. Liver: normal LFts. Off phlebotomies.  - Gilbert's: Intermittently has elevated bilirubin. Bilirubin slightly elevated today  6. FEN: Creat, lytes wnl. Eating well.   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 Discontinued Gengraf 2/11/14  8. Arachnoid cyst: accidental finding; likely benign; following with neurosurgery    Plan:   He is now 2.5 yr from completing brentuximab; next visit we will discuss discontinuing imaging.  Flu shot today 12/01/17  Gardasil 3rd dose 12/01/17 in the system.   IVAN Orellana on 6/1/18 with labs and CT neck CAP and Neck w/ and w/o contrast; scans the day prior to visit. All orders in [facility].  With primary care needs to screen for skin and oral cancer at least yearly, needs to yearly eye  exams, thyroid function, cholesterol, CAD, bone health (vit D and calcium). Dentist visit at least once yearly.        Sage Orellana MD

## 2017-12-01 NOTE — MR AVS SNAPSHOT
After Visit Summary   12/1/2017    Amadou Landrum    MRN: [id number]           Patient Information     Date Of Birth          1990        Visit Information        Provider Department      12/1/2017 12:30 PM Sage Orellana MD [facility] Marrow Transplant        Today's Diagnoses     Stem cells transplant status (H)    -  1          [facility] ([facility])  [address] [place] [address]  Phone: [phone]  Clinic Hours:   Monday-Thursday:7am to 7pm   Friday: 7am to 5pm   Weekends and holidays:    8am to noon (in general)  If your fever is 100.5  or greater,   call the clinic.  After hours call the   hospital at [phone] or   [phone]. Ask for the BMT   fellow on-call           Care Instructions    Plan:   He is now 2.5 yr from completing brentuximab; next visit we will discuss discontinuing imaging.  Flu shot today 12/01/17  Gardasil 3rd dose 12/01/17 in the system.   RTC Reyna on 6/1/18 with labs and CT neck CAP and Neck w/ and w/o contrast; scans the day prior to visit.  With primary care needs to screen for skin and oral cancer at least yearly, needs to yearly eye exams, thyroid function, cholesterol, CAD, bone health (vit D and calcium). Dentist visit at least once yearly.          Follow-ups after your visit        Your next 10 appointments already scheduled     May 31, 2018  5:00 PM CDT   [facility] Draw with  [facility] DRAW   [facility] ([facility])    [address] [place] [address]  [address] [facility] [address] [phone]            May 31, 2018  5:20 PM CDT   (Arrive by 5:05 PM)   CT CHEST ABDOMEN PELVIS W/O & W CONTRAST with UCCT2   [facility] [place] [facility] ([facility])    [address] [address] [phone]           Please bring any scans or X-rays taken at other hospitals, if similar tests were done. Also bring a list  of your medicines, including vitamins, minerals and over-the-counter drugs. It is safest to leave personal items at home.  Be sure to tell your doctor:   If you have any allergies.   If there s any chance you are pregnant.   If you are breastfeeding.   If you have any special needs.  You may have contrast for this exam. To prepare:   Do not eat or drink for 2 hours before your exam. If you need to take medicine, you may take it with small sips of water. (We may ask you to take liquid medicine as well.)   The day before your exam, drink extra fluids at least six 8-ounce glasses (unless your doctor tells you to restrict your fluids).  Patients over 70 or patients with diabetes or kidney problems:   If you haven t had a blood test (creatinine test) within the last 30 days, go to your clinic or Diagnostic Imaging Department for this test.  If you have diabetes:   If your kidney function is normal, continue taking your metformin (Avandamet, Glucophage, Glucovance, Metaglip) on the day of your exam.   If your kidney function is abnormal, wait 48 hours before restarting this medicine.  You will have oral contrast for this exam:   You will drink the contrast at home. Get this from your clinic or Diagnostic Imaging Department. Please follow the directions given.  Please wear loose clothing, such as a sweat suit or jogging clothes. Avoid snaps, zippers and other metal. We may ask you to undress and put on a hospital gown.  If you have any questions, please call the Imaging Department where you will have your exam.            May 31, 2018  5:40 PM CDT   (Arrive by 5:25 PM)   CT SOFT TISSUE NECK W CONTRAST with UCCT2   [facility] ([facility])    [address] [address] [phone]           Please bring any scans or X-rays taken at other hospitals, if similar tests were done. Also bring a list of your medicines, including vitamins, minerals and  over-the-counter drugs. It is safest to leave personal items at home.  Be sure to tell your doctor:   If you have any allergies.   If there s any chance you are pregnant.   If you are breastfeeding.   If you have any special needs.  You will have contrast for this exam. To prepare:   Do not eat or drink for 2 hours before your exam. If you need to take medicine, you may take it with small sips of water. (We may ask you to take liquid medicine as well.)   The day before your exam, drink extra fluids at least six 8-ounce glasses (unless your doctor tells you to restrict your fluids).  Patients over 70 or patients with diabetes or kidney problems:   If you haven t had a blood test (creatinine test) within the last 30 days, go to your clinic or Diagnostic Imaging Department for this test.  If you have diabetes:   If your kidney function is normal, continue taking your metformin (Avandamet, Glucophage, Glucovance, Metaglip) on the day of your exam.   If your kidney function is abnormal, wait 48 hours before restarting this medicine.  Please wear loose clothing, such as a sweat suit or jogging clothes. Avoid snaps, zippers and other metal. We may ask you to undress and put on a hospital gown.  If you have any questions, please call the Imaging Department where you will have your exam.            Jun 01, 2018 12:30 PM CDT   Return with Sage Orellana MD   [facility] Blood and Marrow Transplant ([facility])    [address] [place] [address] [phone]              Future tests that were ordered for you today     Open Future Orders        Priority Expected Expires Ordered    CBC with platelets differential Routine 6/1/2018 6/1/2018 12/1/2017    Comprehensive metabolic panel Routine 6/1/2018 6/1/2018 12/1/2017    Ferritin Routine 6/1/2018 6/1/2018 12/1/2017    CT Soft tissue neck w contrast* Routine 5/31/2018 12/1/2018 12/1/2017    CT Chest abdomen pelvis w &  w/o contrast Routine 5/31/2018 12/1/2018 12/1/2017            Who to contact     If you have questions or need follow up information about today's clinic visit or your schedule please contact [facility] directly at [phone].  Normal or non-critical lab and imaging results will be communicated to you by [employer], letter or phone within 4 business days after the clinic has received the results. If you do not hear from us within 7 days, please contact the clinic through [employer] or phone. If you have a critical or abnormal lab result, we will notify you by phone as soon as possible.  Submit refill requests through [employer] or call your pharmacy and they will forward the refill request to us. Please allow 3 business days for your refill to be completed.          Additional Information About Your Visit        [employer] Information     [employer] gives you secure access to your electronic health record. If you see a primary care provider, you can also send messages to your care team and make appointments. If you have questions, please call your primary care clinic.  If you do not have a primary care provider, please call [phone] and they will assist you.        Care EveryWhere ID     This is your Care EveryWhere ID. This could be used by other organizations to access your [place] [facility] medical records  [id number]        Your Vitals Were     Pulse Temperature Respirations Pulse Oximetry BMI (Body Mass Index)       87 97.4  F (36.3  C) (Oral) 18 100% 25.95 kg/m2        Blood Pressure from Last 3 Encounters:   12/01/17 143/80   05/19/17 121/72   10/06/16 116/77    Weight from Last 3 Encounters:   12/01/17 86.8 kg (191 lb 5.8 oz)   05/19/17 87.1 kg (192 lb 1.6 oz)   10/06/16 83.1 kg (183 lb 1.6 oz)              We Performed the Following     CBC with platelets differential     Comprehensive metabolic panel     Ferritin        Recent Review Flowsheet Data     BMT Recent Results Latest Ref Rng & Units  8/8/2016 9/7/2016 11/18/2016 12/2/2016 12/19/2016 5/18/2017 12/1/2017    WBC 4.0 - 11.0 10e9/L 5.8 7.1 6.1 - - 7.4 9.0    Hemoglobin 13.3 - 17.7 g/dL 13.6 16.3 16.3 - - 15.6 15.8    Platelet Count 150 - 450 10e9/L 132(L) 148(L) 158 - - 155 176    Neutrophils (Absolute) 1.6 - 8.3 10e9/L 3.7 4.8 3.8 - - 4.7 6.7    INR 0.86 - 1.14 - - - - - - -    Sodium 133 - 144 mmol/L 137 141 142 - - 141 -    Potassium 3.4 - 5.3 mmol/L 3.6 4.1 3.8 - - 3.8 -    Chloride 94 - 109 mmol/L 104 104 106 - - 105 -    Glucose 70 - 99 mg/dL 94 95 85 - - 96 -    Urea Nitrogen 7 - 30 mg/dL 10 11 13 - - 14 -    Creatinine 0.66 - 1.25 mg/dL 0.91 0.98 0.96 - - 0.82 -    Calcium (Total) 8.5 - 10.1 mg/dL 8.3(L) 9.2 9.0 - - 8.6 -    Protein (Total) 6.8 - 8.8 g/dL 6.4(L) 7.7 7.9 7.6 7.6 7.6 -    Albumin 3.4 - 5.0 g/dL 3.7 4.3 4.1 4.0 4.0 4.2 -    Bilirubin (Direct) 0.0 - 0.2 mg/dL - - - 0.3(H) 0.4(H) - -    Alkaline Phosphatase 40 - 150 U/L 57 70 75 83 62 66 -    AST 0 - 45 U/L 26 43 106(H) 99(H) 88(H) 30 -    ALT 0 - 70 U/L 35 65 194(H) 182(H) 158(H) 31 -    MCV 78 - 100 fl 93 91 91 - - 91 90               Primary Care Provider Office Phone # Fax #    Dorothy Mojica [phone]       [facility] [address] [place] [address]        Equal Access to Services     MADELYN ALMARAZ : Hadii kassandra Barroso, waaxda luqadaha, qaybta kaalmada pérezda, pee whitakeremileegrupo medina. So [facility] [phone].    ATENCIÓN: Si habla español, tiene a harrison disposición servicios gratuitos de asistencia lingüística. Llame al [phone].    We comply with applicable federal civil rights laws and [place] laws. We do not discriminate on the basis of race, color, national origin, age, disability, sex, sexual orientation, or gender identity.            Thank you!     Thank you for choosing [facility]  for your care. Our goal is always to provide you with excellent care. Hearing back  from our patients is one way we can continue to improve our services. Please take a few minutes to complete the written survey that you may receive in the mail after your visit with us. Thank you!             Your Updated Medication List - Protect others around you: Learn how to safely use, store and throw away your medicines at www.disposemymeds.org.      Notice  As of 12/1/2017  1:45 PM    You have not been prescribed any medications.

## 2017-12-01 NOTE — NURSING NOTE
"Injectable Influenza Immunization Documentation    1.  Has the patient received the information for the injectable influenza vaccine? YES     2. Is the patient 6 months of age or older? YES     3. Does the patient have any of the following contraindications?         Severe allergy to eggs?  No     Severe allergic reaction to previous influenza vaccines?  No   Severe allergy to latex?  No       History of Guillain-Cambridge syndrome?  No     Currently have a temperature greater than 100.4F?  No        4.  Severely egg allergic patients should have flu vaccine eligibility assessed by an MD, RN, or pharmacist, and those who received flu vaccine should be observed for 15 min by an MD, RN, Pharmacist, Medical Technician, or member of clinic staff.\": NO    5. Latex-allergic patients should be given latex-free influenza vaccine No. Please reference the Vaccine latex table to determine if your clinic s product is latex-containing.       Vaccination given by Brittanie Rob    2 IM injections were given and tolerated well. See MAR.        "

## 2018-07-19 ENCOUNTER — RADIANT APPOINTMENT (OUTPATIENT)
Dept: CT IMAGING | Facility: CLINIC | Age: 28
End: 2018-07-19
Attending: INTERNAL MEDICINE
Payer: COMMERCIAL

## 2018-07-19 DIAGNOSIS — Z94.84 STEM CELLS TRANSPLANT STATUS (H): ICD-10-CM

## 2018-07-19 RX ORDER — IOPAMIDOL 755 MG/ML
118 INJECTION, SOLUTION INTRAVASCULAR ONCE
Status: COMPLETED | OUTPATIENT
Start: 2018-07-19 | End: 2018-07-19

## 2018-07-19 RX ADMIN — IOPAMIDOL 100 ML: 755 INJECTION, SOLUTION INTRAVASCULAR at 17:05

## 2018-07-19 NOTE — DISCHARGE INSTRUCTIONS

## 2018-07-20 ENCOUNTER — ONCOLOGY VISIT (OUTPATIENT)
Dept: TRANSPLANT | Facility: CLINIC | Age: 28
End: 2018-07-20
Attending: INTERNAL MEDICINE
Payer: COMMERCIAL

## 2018-07-20 VITALS
DIASTOLIC BLOOD PRESSURE: 86 MMHG | TEMPERATURE: 98.8 F | HEART RATE: 83 BPM | WEIGHT: 195.5 LBS | BODY MASS INDEX: 26.51 KG/M2 | SYSTOLIC BLOOD PRESSURE: 145 MMHG | OXYGEN SATURATION: 98 %

## 2018-07-20 DIAGNOSIS — Z94.81 S/P ALLOGENEIC BONE MARROW TRANSPLANT (H): Primary | ICD-10-CM

## 2018-07-20 LAB
ALBUMIN SERPL-MCNC: 4.2 G/DL (ref 3.4–5)
ALP SERPL-CCNC: 76 U/L (ref 40–150)
ALT SERPL W P-5'-P-CCNC: 31 U/L (ref 0–70)
ANION GAP SERPL CALCULATED.3IONS-SCNC: 6 MMOL/L (ref 3–14)
AST SERPL W P-5'-P-CCNC: 29 U/L (ref 0–45)
BASOPHILS # BLD AUTO: 0 10E9/L (ref 0–0.2)
BASOPHILS NFR BLD AUTO: 0.5 %
BILIRUB SERPL-MCNC: 2.1 MG/DL (ref 0.2–1.3)
BUN SERPL-MCNC: 12 MG/DL (ref 7–30)
CALCIUM SERPL-MCNC: 9 MG/DL (ref 8.5–10.1)
CHLORIDE SERPL-SCNC: 106 MMOL/L (ref 94–109)
CO2 SERPL-SCNC: 29 MMOL/L (ref 20–32)
CREAT SERPL-MCNC: 1.04 MG/DL (ref 0.66–1.25)
DIFFERENTIAL METHOD BLD: NORMAL
EOSINOPHIL # BLD AUTO: 0.1 10E9/L (ref 0–0.7)
EOSINOPHIL NFR BLD AUTO: 1.3 %
ERYTHROCYTE [DISTWIDTH] IN BLOOD BY AUTOMATED COUNT: 12.7 % (ref 10–15)
FERRITIN SERPL-MCNC: 782 NG/ML (ref 26–388)
GFR SERPL CREATININE-BSD FRML MDRD: 86 ML/MIN/1.7M2
GLUCOSE SERPL-MCNC: 88 MG/DL (ref 70–99)
HCT VFR BLD AUTO: 48.2 % (ref 40–53)
HGB BLD-MCNC: 16.7 G/DL (ref 13.3–17.7)
IMM GRANULOCYTES # BLD: 0 10E9/L (ref 0–0.4)
IMM GRANULOCYTES NFR BLD: 0.5 %
LYMPHOCYTES # BLD AUTO: 1.3 10E9/L (ref 0.8–5.3)
LYMPHOCYTES NFR BLD AUTO: 21.3 %
MCH RBC QN AUTO: 31.8 PG (ref 26.5–33)
MCHC RBC AUTO-ENTMCNC: 34.6 G/DL (ref 31.5–36.5)
MCV RBC AUTO: 92 FL (ref 78–100)
MONOCYTES # BLD AUTO: 0.6 10E9/L (ref 0–1.3)
MONOCYTES NFR BLD AUTO: 9.8 %
NEUTROPHILS # BLD AUTO: 4 10E9/L (ref 1.6–8.3)
NEUTROPHILS NFR BLD AUTO: 66.6 %
NRBC # BLD AUTO: 0 10*3/UL
NRBC BLD AUTO-RTO: 0 /100
PLATELET # BLD AUTO: 171 10E9/L (ref 150–450)
POTASSIUM SERPL-SCNC: 4.7 MMOL/L (ref 3.4–5.3)
PROT SERPL-MCNC: 8.2 G/DL (ref 6.8–8.8)
RBC # BLD AUTO: 5.25 10E12/L (ref 4.4–5.9)
SODIUM SERPL-SCNC: 141 MMOL/L (ref 133–144)
WBC # BLD AUTO: 6 10E9/L (ref 4–11)

## 2018-07-20 PROCEDURE — G0463 HOSPITAL OUTPT CLINIC VISIT: HCPCS | Mod: ZF

## 2018-07-20 PROCEDURE — 80053 COMPREHEN METABOLIC PANEL: CPT | Performed by: INTERNAL MEDICINE

## 2018-07-20 PROCEDURE — 85025 COMPLETE CBC W/AUTO DIFF WBC: CPT | Performed by: INTERNAL MEDICINE

## 2018-07-20 PROCEDURE — 86787 VARICELLA-ZOSTER ANTIBODY: CPT | Performed by: INTERNAL MEDICINE

## 2018-07-20 PROCEDURE — 82728 ASSAY OF FERRITIN: CPT | Performed by: INTERNAL MEDICINE

## 2018-07-20 ASSESSMENT — PAIN SCALES - GENERAL: PAINLEVEL: NO PAIN (0)

## 2018-07-20 NOTE — PATIENT INSTRUCTIONS
He is now 3.5 yr from completing brentuximab  Repeat Scans in 1 yr  Flu shot in the fall  Establish primary care MD for screen for skin and oral cancer at least yearly, needs to yearly eye exams, thyroid function, cholesterol, CAD screening, bone health (vit D and calcium). Dentist visit at least once yearly.  Wait for neurosurgery advice ([employer] message sent)

## 2018-07-20 NOTE — LETTER
7/20/2018      RE: Amadou Landrum  [address] [place] [address]       BMT Clinic Progress Note    Amadou is a 23 yo s/p NMA allo sib PBSCT for Hodgkin lymphoma, with relapsed disease, on brentuximab with complete response to therapy based on PET/CT findings. S/p 16 cycles of brentuximab completed MAY 2015.  CC: Follow-up     HPI:  Patient returns for follow-up. He has bee doing very well.  He has been working for [employer].  He traveled with his wife a couple times.  He has been physically active.  He has no weight loss.  He denies any mouth or dental problems.  There were no other complaints today.      ROS: 10 point ROS otherwise unremarkable other than what is noted in the HPI.   Physical Exam:      Vitals:    07/20/18 1024   BP: 145/86   Pulse: 83   Temp: 98.8  F (37.1  C)   TempSrc: Oral   SpO2: 98%   Weight: 88.7 kg (195 lb 8 oz)     Wt Readings from Last 4 Encounters:   07/20/18 88.7 kg (195 lb 8 oz)   12/01/17 86.8 kg (191 lb 5.8 oz)   05/19/17 87.1 kg (192 lb 1.6 oz)   10/06/16 83.1 kg (183 lb 1.6 oz)     HEENT: PERRL, EOMI, MMM. Mouth little dry but no definite changes of GVHD.  Neck with no palpable nodes or masses. Range of motion seems intact.  Chest: CTAB,   CVS: S1S2 RRR, no murmurs or gallops  Abdomen : soft non tender, no hepatosplenomegaly  EXt no edema warm good perfusion  CNS: non focal      Lab Results   Component Value Date    WBC 6.0 07/20/2018    ANEU 4.0 07/20/2018    HGB 16.7 07/20/2018    HCT 48.2 07/20/2018     07/20/2018     12/01/2017    POTASSIUM 3.7 12/01/2017    CHLORIDE 103 12/01/2017    CO2 29 12/01/2017    GLC 94 12/01/2017    BUN 10 12/01/2017    CR 0.91 12/01/2017    MAG 1.8 04/10/2015    INR 1.14 01/22/2016    BILITOTAL 1.6 (H) 12/01/2017    AST 24 12/01/2017    ALT 30 12/01/2017    ALKPHOS 66 12/01/2017    PROTTOTAL 8.4 12/01/2017    ALBUMIN 4.2 12/01/2017   Ferritin pending.   CT of the chest abdomen pelvis shows no evidence of disease progression.  The  CT of the neck still pending.    Assessment and Plan:      Amadou is a 25yo with a h/o Hodgkins lymphoma, s/p NMA allo sib, relapsed; S/P brentuximab 16 cycles.     1. Hodgkin's lymphoma: CTs showed continued CR.  We will continue with yearly imaging until he is at least 5 years out.  Then will discontinue imaging.  16 of brentuximab completed in May 2015. Neuropathy unchanged.     2. BMT: s/p NMA allo sib PBSCT for Hodgkin lymphoma, protocol 2001-10. Previous auto PBSCT in August 2012. Relapsed 5/6/14. In CR as of Nov 2016 CT again as above.     3. Heme: will wait for his ferritin measurement today to decide whether or not he requires additional phlebotomies.  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 Exjade caused profound neutropenia when given during brentuximab therapy.     4. ID: According to our records he completed all the recommended vaccinations.  We recommended flu vaccination in the fall.  We will check his varicella titers to determine if you develop an immune response.  If not we might consider giving him the new virus and inactivated shingles vaccine.    - completed vaccination today, gardasil was 3rd dose today, flu shot today. IgG and CD4 counts were good in Sep 2015  5. Liver: normal LFts. Off phlebotomies.  - Gilbert's: Intermittently has elevated bilirubin. Bilirubin slightly elevated today    6. FEN: Creat, lytes wnl. Eating well.     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 Discontinued Gengraf 2/11/14    8. Arachnoid cyst: The follow-up in 2014 never happened.  I sent a message to the neurosurgeon today to see if it still valuable and having the patient seen and have repeated imaging of this arachnoid cyst.      Plan:   He is now 3.5 yr from completing brentuximab  Repeat Scans in 1 yr  Flu shot in the fall  Establish primary care MD for screen for skin and oral cancer at least yearly, needs to yearly eye exams, thyroid function,  cholesterol, CAD screening, bone health (vit D and calcium). Dentist visit at least once yearly.  Wait for neurosurgery advice (EPic message sent)        Sage rOellana MD

## 2018-07-20 NOTE — PROGRESS NOTES
BMT Clinic Progress Note    Amadou is a 23 yo s/p NMA allo sib PBSCT for Hodgkin lymphoma, with relapsed disease, on brentuximab with complete response to therapy based on PET/CT findings. S/p 16 cycles of brentuximab completed MAY 2015.  CC: Follow-up     HPI:  Patient returns for follow-up. He has bee doing very well.  He has been working for [employer].  He traveled with his wife a couple times.  He has been physically active.  He has no weight loss.  He denies any mouth or dental problems.  There were no other complaints today.      ROS: 10 point ROS otherwise unremarkable other than what is noted in the HPI.   Physical Exam:      Vitals:    07/20/18 1024   BP: 145/86   Pulse: 83   Temp: 98.8  F (37.1  C)   TempSrc: Oral   SpO2: 98%   Weight: 88.7 kg (195 lb 8 oz)     Wt Readings from Last 4 Encounters:   07/20/18 88.7 kg (195 lb 8 oz)   12/01/17 86.8 kg (191 lb 5.8 oz)   05/19/17 87.1 kg (192 lb 1.6 oz)   10/06/16 83.1 kg (183 lb 1.6 oz)     HEENT: PERRL, EOMI, MMM. Mouth little dry but no definite changes of GVHD.  Neck with no palpable nodes or masses. Range of motion seems intact.  Chest: CTAB,   CVS: S1S2 RRR, no murmurs or gallops  Abdomen : soft non tender, no hepatosplenomegaly  EXt no edema warm good perfusion  CNS: non focal      Lab Results   Component Value Date    WBC 6.0 07/20/2018    ANEU 4.0 07/20/2018    HGB 16.7 07/20/2018    HCT 48.2 07/20/2018     07/20/2018     12/01/2017    POTASSIUM 3.7 12/01/2017    CHLORIDE 103 12/01/2017    CO2 29 12/01/2017    GLC 94 12/01/2017    BUN 10 12/01/2017    CR 0.91 12/01/2017    MAG 1.8 04/10/2015    INR 1.14 01/22/2016    BILITOTAL 1.6 (H) 12/01/2017    AST 24 12/01/2017    ALT 30 12/01/2017    ALKPHOS 66 12/01/2017    PROTTOTAL 8.4 12/01/2017    ALBUMIN 4.2 12/01/2017   Ferritin pending.   CT of the chest abdomen pelvis shows no evidence of disease progression.  The CT of the neck still pending.    Assessment and Plan:      Amadou is a 25yo with a h/o  Hodgkins lymphoma, s/p NMA allo sib, relapsed; S/P brentuximab 16 cycles.     1. Hodgkin's lymphoma: CTs showed continued CR.  We will continue with yearly imaging until he is at least 5 years out.  Then will discontinue imaging.  16 of brentuximab completed in May 2015. Neuropathy unchanged.     2. BMT: s/p NMA allo sib PBSCT for Hodgkin lymphoma, protocol 2001-10. Previous auto PBSCT in August 2012. Relapsed 5/6/14. In CR as of Nov 2016 CT again as above.     3. Heme: will wait for his ferritin measurement today to decide whether or not he requires additional phlebotomies.  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 Exjade caused profound neutropenia when given during brentuximab therapy.     4. ID: According to our records he completed all the recommended vaccinations.  We recommended flu vaccination in the fall.  We will check his varicella titers to determine if you develop an immune response.  If not we might consider giving him the new virus and inactivated shingles vaccine.    - completed vaccination today, gardasil was 3rd dose today, flu shot today. IgG and CD4 counts were good in Sep 2015  5. Liver: normal LFts. Off phlebotomies.  - Gilbert's: Intermittently has elevated bilirubin. Bilirubin slightly elevated today    6. FEN: Creat, lytes wnl. Eating well.     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 Discontinued Gengraf 2/11/14    8. Arachnoid cyst: The follow-up in 2014 never happened.  I sent a message to the neurosurgeon today to see if it still valuable and having the patient seen and have repeated imaging of this arachnoid cyst.      Plan:   He is now 3.5 yr from completing brentuximab  Repeat Scans in 1 yr  Flu shot in the fall  Establish primary care MD for screen for skin and oral cancer at least yearly, needs to yearly eye exams, thyroid function, cholesterol, CAD screening, bone health (vit D and calcium). Dentist visit at least once  yearly.  Wait for neurosurgery advice (EPic message sent)

## 2018-07-20 NOTE — NURSING NOTE
Oncology Rooming Note    July 20, 2018 10:24 AM   Amadou Landrum is a 28 year old male who presents for:    Chief Complaint   Patient presents with     RECHECK     Return, Hodgkins Lymphoma     Initial Vitals: /86  Pulse 83  Temp 98.8  F (37.1  C) (Oral)  Wt 88.7 kg (195 lb 8 oz)  SpO2 98%  BMI 26.51 kg/m2 Estimated body mass index is 26.51 kg/(m^2) as calculated from the following:    Height as of 8/18/16: 1.829 m (6').    Weight as of this encounter: 88.7 kg (195 lb 8 oz). Body surface area is 2.12 meters squared.  No Pain (0) Comment: Data Unavailable   No LMP for male patient.  Allergies reviewed: Yes  Medications reviewed: Yes    Medications: Medication refills not needed today.  Pharmacy name entered into EPIC:    [facility] - [place], [place] - [address] [id number] - [place], [place] - [address]     Clinical concerns: None     6 minutes for nursing intake (face to face time)     Faith Thomas CMA

## 2018-07-20 NOTE — MR AVS SNAPSHOT
After Visit Summary   7/20/2018    Amadou Landrum    MRN: [id number]           Patient Information     Date Of Birth          1990        Visit Information        Provider Department      7/20/2018 10:30 AM Sage Orellana MD [facility] Blood and Marrow Transplant        Today's Diagnoses     S/P allogeneic bone marrow transplant (H)    -  1          [facility] ([facility] [place]  [address] [place] [id number]  Phone: [phone]  Clinic Hours:   Monday-Thursday:7am to 7pm   Friday: 7am to 5pm   Weekends and holidays:    8am to noon (in general)  If your fever is 100.5  or greater,   call the clinic.  After hours call the   hospital at [phone] or   [phone]. Ask for the BMT   fellow on-call           Care Instructions    He is now 3.5 yr from completing brentuximab  Repeat Scans in 1 yr  Flu shot in the fall  Establish primary care MD for screen for skin and oral cancer at least yearly, needs to yearly eye exams, thyroid function, cholesterol, CAD screening, bone health (vit D and calcium). Dentist visit at least once yearly.  Wait for neurosurgery advice (EPic message sent)          Follow-ups after your visit        Future tests that were ordered for you today     Open Future Orders        Priority Expected Expires Ordered    CT Chest abdomen pelvis w & w/o contrast Routine  7/20/2019 7/20/2018    CT Soft tissue neck w contrast* Routine  7/20/2019 7/20/2018    Comprehensive metabolic panel Routine 7/18/2019 7/18/2019 7/20/2018    CBC with platelets differential Routine 7/18/2019 7/18/2019 7/20/2018    Ferritin Routine 7/18/2019 7/18/2019 7/20/2018            Who to contact     If you have questions or need follow up information about today's clinic visit or your schedule please contact [facility] directly at [phone].  Normal or non-critical lab and imaging results will be communicated to you by MyChart, letter or phone within 4  business days after the clinic has received the results. If you do not hear from us within 7 days, please contact the clinic through [employer] or phone. If you have a critical or abnormal lab result, we will notify you by phone as soon as possible.  Submit refill requests through [employer] or call your pharmacy and they will forward the refill request to us. Please allow 3 business days for your refill to be completed.          Additional Information About Your Visit        [employer] Information     [employer] gives you secure access to your electronic health record. If you see a primary care provider, you can also send messages to your care team and make appointments. If you have questions, please call your primary care clinic.  If you do not have a primary care provider, please call [phone] and they will assist you.        Care EveryWhere ID     This is your Care EveryWhere ID. This could be used by other organizations to access your [facility] medical records  [id number]        Your Vitals Were     Pulse Temperature Pulse Oximetry BMI (Body Mass Index)          83 98.8  F (37.1  C) (Oral) 98% 26.51 kg/m2         Blood Pressure from Last 3 Encounters:   07/20/18 145/86   12/01/17 143/80   05/19/17 121/72    Weight from Last 3 Encounters:   07/20/18 88.7 kg (195 lb 8 oz)   12/01/17 86.8 kg (191 lb 5.8 oz)   05/19/17 87.1 kg (192 lb 1.6 oz)              We Performed the Following     CBC with platelets differential     Comprehensive metabolic panel     Ferritin     Varicella Zoster Virus Antibody IgG        Recent Review Flowsheet Data     BMT Recent Results Latest Ref Rng & Units 9/7/2016 11/18/2016 12/2/2016 12/19/2016 5/18/2017 12/1/2017 7/20/2018    WBC 4.0 - 11.0 10e9/L 7.1 6.1 - - 7.4 9.0 6.0    Hemoglobin 13.3 - 17.7 g/dL 16.3 16.3 - - 15.6 15.8 16.7    Platelet Count 150 - 450 10e9/L 148(L) 158 - - 155 176 171    Neutrophils (Absolute) 1.6 - 8.3 10e9/L 4.8 3.8 - - 4.7 6.7 4.0    INR 0.86 - 1.14 - - - - - - -     Sodium 133 - 144 mmol/L 141 142 - - 141 139 -    Potassium 3.4 - 5.3 mmol/L 4.1 3.8 - - 3.8 3.7 -    Chloride 94 - 109 mmol/L 104 106 - - 105 103 -    Glucose 70 - 99 mg/dL 95 85 - - 96 94 -    Urea Nitrogen 7 - 30 mg/dL 11 13 - - 14 10 -    Creatinine 0.66 - 1.25 mg/dL 0.98 0.96 - - 0.82 0.91 -    Calcium (Total) 8.5 - 10.1 mg/dL 9.2 9.0 - - 8.6 9.5 -    Protein (Total) 6.8 - 8.8 g/dL 7.7 7.9 7.6 7.6 7.6 8.4 -    Albumin 3.4 - 5.0 g/dL 4.3 4.1 4.0 4.0 4.2 4.2 -    Bilirubin (Direct) 0.0 - 0.2 mg/dL - - 0.3(H) 0.4(H) - - -    Alkaline Phosphatase 40 - 150 U/L 70 75 83 62 66 66 -    AST 0 - 45 U/L 43 106(H) 99(H) 88(H) 30 24 -    ALT 0 - 70 U/L 65 194(H) 182(H) 158(H) 31 30 -    MCV 78 - 100 fl 91 91 - - 91 90 92               Primary Care Provider Office Phone # Fax #    Dorothy NORWOOD MD Yunior [phone]       [facility] [address] [place] [address]        Equal Access to Services     [facility] AH: Hadii aad ku hadasho Soomaali, waaxda luqadaha, qaybta kaalmada adeegyada, waxay idiin haypeen adeatul khemileesh la'emma medina. So [facility] [phone].    ATENCIÓN: Si habla español, tiene a hrarison disposición servicios gratuitos de asistencia lingüística. Llame al [phone].    We comply with applicable federal civil rights laws and [place] laws. We do not discriminate on the basis of race, color, national origin, age, disability, sex, sexual orientation, or gender identity.            Thank you!     Thank you for choosing [facility]  for your care. Our goal is always to provide you with excellent care. Hearing back from our patients is one way we can continue to improve our services. Please take a few minutes to complete the written survey that you may receive in the mail after your visit with us. Thank you!             Your Updated Medication List - Protect others around you: Learn how to safely use, store and throw away your medicines at  www.disposemymeds.org.      Notice  As of 7/20/2018 11:08 AM    You have not been prescribed any medications.

## 2018-07-23 LAB — VZV IGG SER QL IA: 0.7 AI (ref 0–0.8)

## 2018-11-09 ENCOUNTER — TELEPHONE (OUTPATIENT)
Dept: TRANSPLANT | Facility: CLINIC | Age: 28
End: 2018-11-09

## 2018-11-09 NOTE — TELEPHONE ENCOUNTER
The patient's primary care physician left a voicemail asking about need for follow-up of his arachnoid cyst.  I contacted the neurosurgeon who had seen the patient in the past and the recommendation is to have no further testing unless the patient has symptoms.  I left a voicemail with the primary care physician regarding this finding and my cell phone number in case she had additional questions.

## 2019-03-12 NOTE — DISCHARGE INSTRUCTIONS

## 2019-07-17 ENCOUNTER — ANCILLARY PROCEDURE (OUTPATIENT)
Dept: CT IMAGING | Facility: CLINIC | Age: 29
End: 2019-07-17
Attending: INTERNAL MEDICINE
Payer: COMMERCIAL

## 2019-07-17 DIAGNOSIS — Z94.81 S/P ALLOGENEIC BONE MARROW TRANSPLANT (H): ICD-10-CM

## 2019-07-17 LAB
ALBUMIN SERPL-MCNC: 4.6 G/DL (ref 3.4–5)
ALP SERPL-CCNC: 73 U/L (ref 40–150)
ALT SERPL W P-5'-P-CCNC: 31 U/L (ref 0–70)
ANION GAP SERPL CALCULATED.3IONS-SCNC: 2 MMOL/L (ref 3–14)
AST SERPL W P-5'-P-CCNC: 32 U/L (ref 0–45)
BASOPHILS # BLD AUTO: 0.1 10E9/L (ref 0–0.2)
BASOPHILS NFR BLD AUTO: 0.8 %
BILIRUB SERPL-MCNC: 2.1 MG/DL (ref 0.2–1.3)
BUN SERPL-MCNC: 14 MG/DL (ref 7–30)
CALCIUM SERPL-MCNC: 9.2 MG/DL (ref 8.5–10.1)
CHLORIDE SERPL-SCNC: 103 MMOL/L (ref 94–109)
CO2 SERPL-SCNC: 30 MMOL/L (ref 20–32)
CREAT SERPL-MCNC: 0.98 MG/DL (ref 0.66–1.25)
DIFFERENTIAL METHOD BLD: NORMAL
EOSINOPHIL # BLD AUTO: 0.2 10E9/L (ref 0–0.7)
EOSINOPHIL NFR BLD AUTO: 2.6 %
ERYTHROCYTE [DISTWIDTH] IN BLOOD BY AUTOMATED COUNT: 12.6 % (ref 10–15)
FERRITIN SERPL-MCNC: 609 NG/ML (ref 26–388)
GFR SERPL CREATININE-BSD FRML MDRD: >90 ML/MIN/{1.73_M2}
GLUCOSE SERPL-MCNC: 87 MG/DL (ref 70–99)
HCT VFR BLD AUTO: 46.9 % (ref 40–53)
HGB BLD-MCNC: 16.3 G/DL (ref 13.3–17.7)
IMM GRANULOCYTES # BLD: 0 10E9/L (ref 0–0.4)
IMM GRANULOCYTES NFR BLD: 0.3 %
LYMPHOCYTES # BLD AUTO: 1.7 10E9/L (ref 0.8–5.3)
LYMPHOCYTES NFR BLD AUTO: 22 %
MCH RBC QN AUTO: 31.6 PG (ref 26.5–33)
MCHC RBC AUTO-ENTMCNC: 34.8 G/DL (ref 31.5–36.5)
MCV RBC AUTO: 91 FL (ref 78–100)
MONOCYTES # BLD AUTO: 0.7 10E9/L (ref 0–1.3)
MONOCYTES NFR BLD AUTO: 9.6 %
NEUTROPHILS # BLD AUTO: 4.9 10E9/L (ref 1.6–8.3)
NEUTROPHILS NFR BLD AUTO: 64.7 %
NRBC # BLD AUTO: 0 10*3/UL
NRBC BLD AUTO-RTO: 0 /100
PLATELET # BLD AUTO: 202 10E9/L (ref 150–450)
POTASSIUM SERPL-SCNC: 3.5 MMOL/L (ref 3.4–5.3)
PROT SERPL-MCNC: 8.8 G/DL (ref 6.8–8.8)
RBC # BLD AUTO: 5.16 10E12/L (ref 4.4–5.9)
SODIUM SERPL-SCNC: 135 MMOL/L (ref 133–144)
WBC # BLD AUTO: 7.6 10E9/L (ref 4–11)

## 2019-07-17 PROCEDURE — 82728 ASSAY OF FERRITIN: CPT | Performed by: INTERNAL MEDICINE

## 2019-07-17 PROCEDURE — 85025 COMPLETE CBC W/AUTO DIFF WBC: CPT | Performed by: INTERNAL MEDICINE

## 2019-07-17 PROCEDURE — 80053 COMPREHEN METABOLIC PANEL: CPT | Performed by: INTERNAL MEDICINE

## 2019-07-17 RX ORDER — IOPAMIDOL 755 MG/ML
200 INJECTION, SOLUTION INTRAVASCULAR ONCE
Status: COMPLETED | OUTPATIENT
Start: 2019-07-17 | End: 2019-07-17

## 2019-07-17 RX ADMIN — IOPAMIDOL 200 ML: 755 INJECTION, SOLUTION INTRAVASCULAR at 17:19

## 2019-07-17 NOTE — DISCHARGE INSTRUCTIONS

## 2019-07-17 NOTE — DISCHARGE INSTRUCTIONS

## 2019-07-17 NOTE — NURSING NOTE
Chief Complaint   Patient presents with     Blood Draw     Lab drawn via PIV placed by RN in lab. Line flushed with saline. VS taken.      Joyce Ho RN

## 2019-07-19 ENCOUNTER — ONCOLOGY VISIT (OUTPATIENT)
Dept: TRANSPLANT | Facility: CLINIC | Age: 29
End: 2019-07-19
Attending: INTERNAL MEDICINE
Payer: COMMERCIAL

## 2019-07-19 VITALS
HEART RATE: 100 BPM | SYSTOLIC BLOOD PRESSURE: 129 MMHG | TEMPERATURE: 98.8 F | DIASTOLIC BLOOD PRESSURE: 79 MMHG | OXYGEN SATURATION: 98 % | HEIGHT: 72 IN | RESPIRATION RATE: 16 BRPM | BODY MASS INDEX: 26.91 KG/M2 | WEIGHT: 198.7 LBS

## 2019-07-19 DIAGNOSIS — Z94.81 S/P ALLOGENEIC BONE MARROW TRANSPLANT (H): Primary | ICD-10-CM

## 2019-07-19 PROCEDURE — 25000581 ZZH RX MED A9270 GY (STAT IND- M) 250: Mod: ZF | Performed by: INTERNAL MEDICINE

## 2019-07-19 PROCEDURE — G0463 HOSPITAL OUTPT CLINIC VISIT: HCPCS | Mod: 25

## 2019-07-19 PROCEDURE — 90750 HZV VACC RECOMBINANT IM: CPT | Mod: ZF | Performed by: INTERNAL MEDICINE

## 2019-07-19 PROCEDURE — 90471 IMMUNIZATION ADMIN: CPT

## 2019-07-19 RX ADMIN — ZOSTER VACCINE RECOMBINANT, ADJUVANTED 0.5 ML: KIT at 11:38

## 2019-07-19 ASSESSMENT — MIFFLIN-ST. JEOR: SCORE: 1914.3

## 2019-07-19 ASSESSMENT — PAIN SCALES - GENERAL: PAINLEVEL: NO PAIN (0)

## 2019-07-19 NOTE — PATIENT INSTRUCTIONS
He is now ~4.5 yr from completing brentuximab  Repeat Scans in 1 yr  Flu shot in the fall  Shingrix vaccine  Establish primary care MD for screen for skin and oral cancer at least yearly, needs to yearly eye exams, thyroid function, cholesterol, CAD screening, bone health (vit D and calcium). Dentist visit at least once yearly.  Referral to late effects BMT Clinic ([facility])

## 2019-07-19 NOTE — NURSING NOTE
Oncology Rooming Note    July 19, 2019 10:24 AM   Amadou Landrum is a 29 year old male who presents for:    Chief Complaint   Patient presents with     RECHECK     Return: Hodgkin's lymphoma      Initial Vitals: /79 (BP Location: Right arm, Patient Position: Sitting, Cuff Size: Adult Regular)   Pulse 100   Temp 98.8  F (37.1  C) (Oral)   Resp 16   Ht 1.829 m (6')   Wt 90.1 kg (198 lb 11.2 oz)   SpO2 98%   BMI 26.95 kg/m   Estimated body mass index is 26.95 kg/m  as calculated from the following:    Height as of this encounter: 1.829 m (6').    Weight as of this encounter: 90.1 kg (198 lb 11.2 oz). Body surface area is 2.14 meters squared.  No Pain (0) Comment: Data Unavailable   No LMP for male patient.  Allergies reviewed: Yes  Medications reviewed: Yes    Medications: Medication refills not needed today.  Pharmacy name entered into [facility]:    [facility] - [place], [place] - [address] [id number] - [place], [place] - [address]    Clinical concerns: N/A      Ning Alvarez CMA

## 2019-07-19 NOTE — PROGRESS NOTES
BMT Clinic Progress Note    Amadou is a 25 yo s/p NMA allo sib PBSCT for Hodgkin lymphoma, with relapsed disease, on brentuximab with complete response to therapy based on PET/CT findings. S/p 16 cycles of brentuximab completed MAY 2015.  CC: Follow-up     HPI:  Patient returns for follow-up.  He is on his own today.  He continues to work for [employer], and is very happy.  He is now coming up in 3 years from his wedding.  He is physically active.  He is following up with his primary care.  He sees his primary care every 6 every year and is every year alternating every 6 months.  He is going to the dentist regularly and probably needs a dental treatment.  Otherwise there is no dry mouth or dry eyes.  He has no skin rashes.  He has no gastrointestinal or genitourinary complaints.  He is traveling for business and has a stable weight and no other complaints.    ROS: 10 point ROS otherwise unremarkable other than what is noted in the HPI.   Physical Exam:      Vitals:    07/19/19 1024   BP: 129/79   BP Location: Right arm   Patient Position: Sitting   Cuff Size: Adult Regular   Pulse: 100   Resp: 16   Temp: 98.8  F (37.1  C)   TempSrc: Oral   SpO2: 98%   Weight: 90.1 kg (198 lb 11.2 oz)   Height: 1.829 m (6')     Wt Readings from Last 4 Encounters:   07/19/19 90.1 kg (198 lb 11.2 oz)   07/20/18 88.7 kg (195 lb 8 oz)   12/01/17 86.8 kg (191 lb 5.8 oz)   05/19/17 87.1 kg (192 lb 1.6 oz)     HEENT: PERRL, EOMI, MMM. Mouth little dry but no definite changes of GVHD.  Neck with no palpable nodes or masses. Range of motion seems intact.  Chest: CTAB,   CVS: S1S2 RRR, no murmurs or gallops  Abdomen : soft non tender, no hepatosplenomegaly  EXt no edema warm good perfusion  CNS: non focal      Lab Results   Component Value Date    WBC 7.6 07/17/2019    ANEU 4.9 07/17/2019    HGB 16.3 07/17/2019    HCT 46.9 07/17/2019     07/17/2019     07/17/2019    POTASSIUM 3.5 07/17/2019    CHLORIDE 103 07/17/2019    CO2 30 07/17/2019     GLC 87 07/17/2019    BUN 14 07/17/2019    CR 0.98 07/17/2019    MAG 1.8 04/10/2015    INR 1.14 01/22/2016    BILITOTAL 2.1 (H) 07/17/2019    AST 32 07/17/2019    ALT 31 07/17/2019    ALKPHOS 73 07/17/2019    PROTTOTAL 8.8 07/17/2019    ALBUMIN 4.6 07/17/2019   He has been ferritin pending.   CT of the chest abdomen pelvis shows no evidence of disease progression.  The CT of the neck still pending.    Assessment and Plan:      Amadou is a 24 but that they said that there was not anything more there and they wanted me to see him and I said sureyo with a h/o Hodgkins lymphoma, s/p NMA allo sib, relapsed; S/P brentuximab 16 cycles.     1. Hodgkin's lymphoma: CTs showed continued CR.  As per our previous discussions with the patient we will repeat his CT is once again at 5 years from the completion of brentuximab, that was in May 2015.  After that we will just follow him clinically.      2. BMT: s/p NMA allo sib PBSCT for Hodgkin lymphoma, protocol 2001-10. Previous auto PBSCT in August 2012. Relapsed 5/6/14. In CR as of Nov 2016 CT again as above.     3. Heme: His ferritin is been in the 600-700.  He is otherwise doing fine.  There is no indication for additional phlebotomies.  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 Exjade caused profound neutropenia when given during brentuximab therapy.     4. ID: He has completed his vaccinations schedules.  We did send a varicella titer to determine if he had an immune his response.  The titer came back negative in 2018 after the vaccinations in 2016.  We discussed the pros and cons and he will receive the recombinant shingles vaccine Shingrix.  I recommended a flu shot in the fall.      5. Liver: normal LFts. Off phlebotomies.  - Gilbert's: Intermittently has elevated bilirubin. Bilirubin slightly elevated today    6. FEN: Creat, lytes wnl. Eating well.     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 Discontinued Gengraf  2/11/14  8. Arachnoid cyst: only return to neurosurgery if issues    9. Lae effect: We discussed the importance of monitoring and managing late effects of cancer therapy.  I recommended and he agreed with the meeting with our late effects clinic within the next 3 to 6 months.  We will provide him with a summary of recommendations that can be mostly implemented by his primary care provider.    Plan:   He is now ~4.5 yr from completing brentuximab  Repeat Scans in 1 yr  Flu shot in the fall  Shingrix vaccine today and booster in 2-3 months   Establish primary care MD for screen for skin and oral cancer at least yearly, needs to yearly eye exams, thyroid function, cholesterol, CAD screening, bone health (vit D and calcium). Dentist visit at least once yearly.  Referral to late effects BMT Clinic ([facility])

## 2019-07-19 NOTE — LETTER
7/19/2019      RE: Amadou Landrum  [address] [facility] [address]       BMT Clinic Progress Note    Amadou is a 23 yo s/p NMA allo sib PBSCT for Hodgkin lymphoma, with relapsed disease, on brentuximab with complete response to therapy based on PET/CT findings. S/p 16 cycles of brentuximab completed MAY 2015.  CC: Follow-up     HPI:  Patient returns for follow-up.  He is on his own today.  He continues to work for [email or web address], and is very happy.  He is now coming up in 3 years from his wedding.  He is physically active.  He is following up with his primary care.  He sees his primary care every 6 every year and is every year alternating every 6 months.  He is going to the dentist regularly and probably needs a dental treatment.  Otherwise there is no dry mouth or dry eyes.  He has no skin rashes.  He has no gastrointestinal or genitourinary complaints.  He is traveling for business and has a stable weight and no other complaints.    ROS: 10 point ROS otherwise unremarkable other than what is noted in the HPI.   Physical Exam:      Vitals:    07/19/19 1024   BP: 129/79   BP Location: Right arm   Patient Position: Sitting   Cuff Size: Adult Regular   Pulse: 100   Resp: 16   Temp: 98.8  F (37.1  C)   TempSrc: Oral   SpO2: 98%   Weight: 90.1 kg (198 lb 11.2 oz)   Height: 1.829 m (6')     Wt Readings from Last 4 Encounters:   07/19/19 90.1 kg (198 lb 11.2 oz)   07/20/18 88.7 kg (195 lb 8 oz)   12/01/17 86.8 kg (191 lb 5.8 oz)   05/19/17 87.1 kg (192 lb 1.6 oz)     HEENT: PERRL, EOMI, MMM. Mouth little dry but no definite changes of GVHD.  Neck with no palpable nodes or masses. Range of motion seems intact.  Chest: CTAB,   CVS: S1S2 RRR, no murmurs or gallops  Abdomen : soft non tender, no hepatosplenomegaly  EXt no edema warm good perfusion  CNS: non focal      Lab Results   Component Value Date    WBC 7.6 07/17/2019    ANEU 4.9 07/17/2019    HGB 16.3 07/17/2019    HCT 46.9 07/17/2019     07/17/2019      07/17/2019    POTASSIUM 3.5 07/17/2019    CHLORIDE 103 07/17/2019    CO2 30 07/17/2019    GLC 87 07/17/2019    BUN 14 07/17/2019    CR 0.98 07/17/2019    MAG 1.8 04/10/2015    INR 1.14 01/22/2016    BILITOTAL 2.1 (H) 07/17/2019    AST 32 07/17/2019    ALT 31 07/17/2019    ALKPHOS 73 07/17/2019    PROTTOTAL 8.8 07/17/2019    ALBUMIN 4.6 07/17/2019   He has been ferritin pending.   CT of the chest abdomen pelvis shows no evidence of disease progression.  The CT of the neck still pending.    Assessment and Plan:      Amadou is a 24 but that they said that there was not anything more there and they wanted me to see him and I said sureyo with a h/o Hodgkins lymphoma, s/p NMA allo sib, relapsed; S/P brentuximab 16 cycles.     1. Hodgkin's lymphoma: CTs showed continued CR.  As per our previous discussions with the patient we will repeat his CT is once again at 5 years from the completion of brentuximab, that was in May 2015.  After that we will just follow him clinically.      2. BMT: s/p NMA allo sib PBSCT for Hodgkin lymphoma, protocol 2001-10. Previous auto PBSCT in August 2012. Relapsed 5/6/14. In CR as of Nov 2016 CT again as above.     3. Heme: His ferritin is been in the 600-700.  He is otherwise doing fine.  There is no indication for additional phlebotomies.  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 Exjade caused profound neutropenia when given during brentuximab therapy.     4. ID: He has completed his vaccinations schedules.  We did send a varicella titer to determine if he had an immune his response.  The titer came back negative in 2018 after the vaccinations in 2016.  We discussed the pros and cons and he will receive the recombinant shingles vaccine Shingrix.  I recommended a flu shot in the fall.      5. Liver: normal LFts. Off phlebotomies.  - Gilbert's: Intermittently has elevated bilirubin. Bilirubin slightly elevated today    6. FEN: Creat,  justino wnl. Eating well.     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 Discontinued Gengraf 2/11/14  8. Arachnoid cyst: only return to neurosurgery if issues    9. Lae effect: We discussed the importance of monitoring and managing late effects of cancer therapy.  I recommended and he agreed with the meeting with our late effects clinic within the next 3 to 6 months.  We will provide him with a summary of recommendations that can be mostly implemented by his primary care provider.    Plan:   He is now ~4.5 yr from completing brentuximab  Repeat Scans in 1 yr  Flu shot in the fall  Shingrix vaccine today and booster in 2-3 months   Establish primary care MD for screen for skin and oral cancer at least yearly, needs to yearly eye exams, thyroid function, cholesterol, CAD screening, bone health (vit D and calcium). Dentist visit at least once yearly.  Referral to late effects BMT Clinic ([facility])    BMT DOM

## 2019-08-16 DIAGNOSIS — Z94.81 S/P ALLOGENEIC BONE MARROW TRANSPLANT (H): Primary | ICD-10-CM

## 2019-08-16 DIAGNOSIS — C81.90 HODGKIN LYMPHOMA (H): ICD-10-CM

## 2019-10-03 ENCOUNTER — HEALTH MAINTENANCE LETTER (OUTPATIENT)
Age: 29
End: 2019-10-03

## 2020-08-12 ENCOUNTER — ANCILLARY PROCEDURE (OUTPATIENT)
Dept: CT IMAGING | Facility: CLINIC | Age: 30
End: 2020-08-12
Attending: INTERNAL MEDICINE
Payer: COMMERCIAL

## 2020-08-12 DIAGNOSIS — Z94.81 S/P ALLOGENEIC BONE MARROW TRANSPLANT (H): ICD-10-CM

## 2020-08-12 DIAGNOSIS — C81.90 HODGKIN LYMPHOMA (H): ICD-10-CM

## 2020-08-12 RX ORDER — IOPAMIDOL 755 MG/ML
122 INJECTION, SOLUTION INTRAVASCULAR ONCE
Status: COMPLETED | OUTPATIENT
Start: 2020-08-12 | End: 2020-08-12

## 2020-08-12 RX ADMIN — IOPAMIDOL 122 ML: 755 INJECTION, SOLUTION INTRAVASCULAR at 17:02

## 2020-08-14 ENCOUNTER — APPOINTMENT (OUTPATIENT)
Dept: LAB | Facility: CLINIC | Age: 30
End: 2020-08-14
Attending: INTERNAL MEDICINE
Payer: COMMERCIAL

## 2020-08-14 ENCOUNTER — ONCOLOGY VISIT (OUTPATIENT)
Dept: TRANSPLANT | Facility: CLINIC | Age: 30
End: 2020-08-14
Attending: INTERNAL MEDICINE
Payer: COMMERCIAL

## 2020-08-14 VITALS
RESPIRATION RATE: 14 BRPM | TEMPERATURE: 97.6 F | OXYGEN SATURATION: 98 % | BODY MASS INDEX: 27.33 KG/M2 | HEART RATE: 85 BPM | DIASTOLIC BLOOD PRESSURE: 82 MMHG | SYSTOLIC BLOOD PRESSURE: 133 MMHG | WEIGHT: 201.5 LBS

## 2020-08-14 DIAGNOSIS — Z94.81 S/P ALLOGENEIC BONE MARROW TRANSPLANT (H): ICD-10-CM

## 2020-08-14 LAB
ALBUMIN SERPL-MCNC: 4.1 G/DL (ref 3.4–5)
ALP SERPL-CCNC: 62 U/L (ref 40–150)
ALT SERPL W P-5'-P-CCNC: 40 U/L (ref 0–70)
ANION GAP SERPL CALCULATED.3IONS-SCNC: 3 MMOL/L (ref 3–14)
AST SERPL W P-5'-P-CCNC: 26 U/L (ref 0–45)
BASOPHILS # BLD AUTO: 0 10E9/L (ref 0–0.2)
BASOPHILS NFR BLD AUTO: 0.6 %
BILIRUB SERPL-MCNC: 2.3 MG/DL (ref 0.2–1.3)
BUN SERPL-MCNC: 10 MG/DL (ref 7–30)
CALCIUM SERPL-MCNC: 9 MG/DL (ref 8.5–10.1)
CHLORIDE SERPL-SCNC: 108 MMOL/L (ref 94–109)
CO2 SERPL-SCNC: 28 MMOL/L (ref 20–32)
CREAT SERPL-MCNC: 1.03 MG/DL (ref 0.66–1.25)
DIFFERENTIAL METHOD BLD: NORMAL
EOSINOPHIL # BLD AUTO: 0.2 10E9/L (ref 0–0.7)
EOSINOPHIL NFR BLD AUTO: 2.8 %
ERYTHROCYTE [DISTWIDTH] IN BLOOD BY AUTOMATED COUNT: 12.7 % (ref 10–15)
FERRITIN SERPL-MCNC: 508 NG/ML (ref 26–388)
GFR SERPL CREATININE-BSD FRML MDRD: >90 ML/MIN/{1.73_M2}
GLUCOSE SERPL-MCNC: 97 MG/DL (ref 70–99)
HCT VFR BLD AUTO: 48.2 % (ref 40–53)
HGB BLD-MCNC: 16.6 G/DL (ref 13.3–17.7)
IMM GRANULOCYTES # BLD: 0 10E9/L (ref 0–0.4)
IMM GRANULOCYTES NFR BLD: 0.3 %
LYMPHOCYTES # BLD AUTO: 1.5 10E9/L (ref 0.8–5.3)
LYMPHOCYTES NFR BLD AUTO: 23.6 %
MCH RBC QN AUTO: 31.7 PG (ref 26.5–33)
MCHC RBC AUTO-ENTMCNC: 34.4 G/DL (ref 31.5–36.5)
MCV RBC AUTO: 92 FL (ref 78–100)
MONOCYTES # BLD AUTO: 0.7 10E9/L (ref 0–1.3)
MONOCYTES NFR BLD AUTO: 11.2 %
NEUTROPHILS # BLD AUTO: 3.9 10E9/L (ref 1.6–8.3)
NEUTROPHILS NFR BLD AUTO: 61.5 %
NRBC # BLD AUTO: 0 10*3/UL
NRBC BLD AUTO-RTO: 0 /100
PLATELET # BLD AUTO: 197 10E9/L (ref 150–450)
POTASSIUM SERPL-SCNC: 4.4 MMOL/L (ref 3.4–5.3)
PROT SERPL-MCNC: 8.3 G/DL (ref 6.8–8.8)
RBC # BLD AUTO: 5.23 10E12/L (ref 4.4–5.9)
SODIUM SERPL-SCNC: 140 MMOL/L (ref 133–144)
T4 FREE SERPL-MCNC: 0.81 NG/DL (ref 0.76–1.46)
TSH SERPL DL<=0.005 MIU/L-ACNC: 10.56 MU/L (ref 0.4–4)
WBC # BLD AUTO: 6.4 10E9/L (ref 4–11)

## 2020-08-14 PROCEDURE — 82728 ASSAY OF FERRITIN: CPT | Performed by: INTERNAL MEDICINE

## 2020-08-14 PROCEDURE — 84443 ASSAY THYROID STIM HORMONE: CPT | Performed by: INTERNAL MEDICINE

## 2020-08-14 PROCEDURE — 80053 COMPREHEN METABOLIC PANEL: CPT | Performed by: INTERNAL MEDICINE

## 2020-08-14 PROCEDURE — G0463 HOSPITAL OUTPT CLINIC VISIT: HCPCS | Mod: ZF

## 2020-08-14 PROCEDURE — 85025 COMPLETE CBC W/AUTO DIFF WBC: CPT | Performed by: INTERNAL MEDICINE

## 2020-08-14 PROCEDURE — 84439 ASSAY OF FREE THYROXINE: CPT | Performed by: INTERNAL MEDICINE

## 2020-08-14 PROCEDURE — 36415 COLL VENOUS BLD VENIPUNCTURE: CPT

## 2020-08-14 ASSESSMENT — PAIN SCALES - GENERAL: PAINLEVEL: NO PAIN (0)

## 2020-08-14 NOTE — NURSING NOTE
Oncology Rooming Note    August 14, 2020 10:24 AM   Amadou Landrum is a 30 year old male who presents for:    Chief Complaint   Patient presents with     Blood Draw     Labs drawn via  by RN in lab. VS taken.      RECHECK     Hodgkins Lymphoma      Initial Vitals: /82   Pulse 85   Temp 97.6  F (36.4  C) (Tympanic)   Resp 14   Wt 91.4 kg (201 lb 8 oz)   SpO2 98%   BMI 27.33 kg/m   Estimated body mass index is 27.33 kg/m  as calculated from the following:    Height as of 7/19/19: 1.829 m (6').    Weight as of this encounter: 91.4 kg (201 lb 8 oz). Body surface area is 2.15 meters squared.  No Pain (0) Comment: Data Unavailable   No LMP for male patient.  Allergies reviewed: Yes  Medications reviewed: Yes    Medications: Medication refills not needed today.  Pharmacy name entered into [employer]:    [facility] - [place], [place] - [address] [id number] - [place], [place] - [address]    Clinical concerns:  None      Faith Thomas CMA

## 2020-08-14 NOTE — PROGRESS NOTES
BMT Clinic Progress Note    Amadou is a 29 yo s/p NMA allo sib PBSCT for Hodgkin lymphoma, with relapsed disease, on brentuximab with complete response to therapy based on PET/CT findings.   Autologous transplant AUG 2012  Allogenic sibling transplant on 9/6/2013  Relapsed Hodgkin's lymphoma 2014  S/p 16 cycles of brentuximab completed MAY 2015.    CC: Follow-up     HPI:  Patient returns for follow-up.  He has been doing very well.  He continues to work for [employer], and is very happy.  His wife is currently looking for a job.  His parents moved permanently to [facility].  He has no dry mouth or dry eyes is been taking relatively good care of his health.  He goes to his primary care regularly.  Taking coronavirus precautions.    ROS: 10 point ROS otherwise unremarkable other than what is noted in the HPI.   Physical Exam:      Vitals:    08/14/20 0957   BP: 133/82   Pulse: 85   Resp: 14   Temp: 97.6  F (36.4  C)   TempSrc: Tympanic   SpO2: 98%   Weight: 91.4 kg (201 lb 8 oz)     Wt Readings from Last 4 Encounters:   08/14/20 91.4 kg (201 lb 8 oz)   07/19/19 90.1 kg (198 lb 11.2 oz)   07/20/18 88.7 kg (195 lb 8 oz)   12/01/17 86.8 kg (191 lb 5.8 oz)     HEENT: PERRL, EOMI, MMM. Mouth little dry but no definite changes of GVHD.  Neck with no palpable nodes or masses. Range of motion seems intact.  Chest: CTAB,   CVS: S1S2 RRR, no murmurs or gallops  Abdomen : soft non tender, no hepatosplenomegaly  EXt no edema warm good perfusion  CNS: non focal  Skin: No rashes, ecchymosis, or petechiae.  No signs of fibrosis or sclerosis.      Lab Results   Component Value Date    WBC 6.4 08/14/2020    ANEU 3.9 08/14/2020    HGB 16.6 08/14/2020    HCT 48.2 08/14/2020     08/14/2020     08/14/2020    POTASSIUM 4.4 08/14/2020    CHLORIDE 108 08/14/2020    CO2 28 08/14/2020    GLC 97 08/14/2020    BUN 10 08/14/2020    CR 1.03 08/14/2020    MAG 1.8 04/10/2015    INR 1.14 01/22/2016    BILITOTAL 2.3 (H) 08/14/2020    AST 26  08/14/2020    ALT 40 08/14/2020    ALKPHOS 62 08/14/2020    PROTTOTAL 8.3 08/14/2020    ALBUMIN 4.1 08/14/2020     AUG 2020 CT of the neck, chest, abdomen, pelvis shows no evidence of disease progression.      Assessment and Plan:      Amadou is a 24 but that they said that there was not anything more there and they wanted me to see him and I said sureyo with a h/o Hodgkins lymphoma, s/p NMA allo sib, relapsed; S/P brentuximab 16 cycles.     1. Hodgkin's lymphoma: CTs showed continued CR.  As per our previous discussions with the patient we will repeat his CT is once again at 5 years from the completion of brentuximab, that was in May 2015.  Our original plan is to only follow him clinically here after.  I will further discuss that with our team and see if there are other more updated recommendations.     2. BMT: s/p NMA allo sib PBSCT for Hodgkin lymphoma, protocol 2001-10. Previous auto PBSCT in August 2012. Relapsed 5/6/14. In CR as of Nov 2016 CT again as above.     3. Heme: His ferritin is 500.  There is no indication for additional phlebotomies.  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 Exjade caused profound neutropenia when given during brentuximab therapy.     4. ID: He has completed his vaccinations schedules.  Immune to varicella.  Recommended flu shot in the fall.     5. Liver: normal LFts. Off phlebotomies.  - Gilbert's: Intermittently has elevated bilirubin. Bilirubin slightly elevated today    6. FEN: Creat, lytes wnl. Eating well.     7. GVHD/Skin: Normal liver function tests.  These abnormalities were possibly related to iron overload.  Discontinued Gengraf 2/11/14    8. 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  He was referred to the clinic that we will provide him with a  detailed note to be implemented by his primary care physician, Dr. Oviedo.  The referral has been made and he will be called once the clinic reopens after the interruption because of COVID-19.    Plan:   He is now ~ 5 yr from completing brentuximab  Repeat Scans in 1 yr  Flu shot in the fall  Continue primary care MD for screen for skin and oral cancer at least yearly, needs to yearly eye exams, thyroid function, cholesterol, CAD screening, bone health (vit D and calcium). Dentist visit at least once yearly.  Referral to late effects BMT Clinic ([facility])

## 2020-08-14 NOTE — NURSING NOTE
Chief Complaint   Patient presents with     Blood Draw     Labs drawn via  by RN in lab. VS taken.      Labs drawn via venipuncture. Vital signs taken. Checked into next appointment.   Renee Julio RN

## 2020-08-14 NOTE — LETTER
8/14/2020         RE: Amadou Landrum  [address] [facility] [address]        Dear Colleague,    Thank you for referring your patient, Amadou Landrum, to the [facility]. Please see a copy of my visit note below.    BMT Clinic Progress Note    Amadou is a 31 yo s/p NMA allo sib PBSCT for Hodgkin lymphoma, with relapsed disease, on brentuximab with complete response to therapy based on PET/CT findings.   Autologous transplant AUG 2012  Allogenic sibling transplant on 9/6/2013  Relapsed Hodgkin's lymphoma 2014  S/p 16 cycles of brentuximab completed MAY 2015.    CC: Follow-up     HPI:  Patient returns for follow-up.  He has been doing very well.  He continues to work for [employer], and is very happy.  His wife is currently looking for a job.  His parents moved permanently to [facility].  He has no dry mouth or dry eyes is been taking relatively good care of his health.  He goes to his primary care regularly.  Taking coronavirus precautions.    ROS: 10 point ROS otherwise unremarkable other than what is noted in the HPI.   Physical Exam:      Vitals:    08/14/20 0957   BP: 133/82   Pulse: 85   Resp: 14   Temp: 97.6  F (36.4  C)   TempSrc: Tympanic   SpO2: 98%   Weight: 91.4 kg (201 lb 8 oz)     Wt Readings from Last 4 Encounters:   08/14/20 91.4 kg (201 lb 8 oz)   07/19/19 90.1 kg (198 lb 11.2 oz)   07/20/18 88.7 kg (195 lb 8 oz)   12/01/17 86.8 kg (191 lb 5.8 oz)     HEENT: PERRL, EOMI, MMM. Mouth little dry but no definite changes of GVHD.  Neck with no palpable nodes or masses. Range of motion seems intact.  Chest: CTAB,   CVS: S1S2 RRR, no murmurs or gallops  Abdomen : soft non tender, no hepatosplenomegaly  EXt no edema warm good perfusion  CNS: non focal  Skin: No rashes, ecchymosis, or petechiae.  No signs of fibrosis or sclerosis.      Lab Results   Component Value Date    WBC 6.4 08/14/2020    ANEU 3.9 08/14/2020    HGB 16.6 08/14/2020    HCT 48.2 08/14/2020      08/14/2020     08/14/2020    POTASSIUM 4.4 08/14/2020    CHLORIDE 108 08/14/2020    CO2 28 08/14/2020    GLC 97 08/14/2020    BUN 10 08/14/2020    CR 1.03 08/14/2020    MAG 1.8 04/10/2015    INR 1.14 01/22/2016    BILITOTAL 2.3 (H) 08/14/2020    AST 26 08/14/2020    ALT 40 08/14/2020    ALKPHOS 62 08/14/2020    PROTTOTAL 8.3 08/14/2020    ALBUMIN 4.1 08/14/2020     AUG 2020 CT of the neck, chest, abdomen, pelvis shows no evidence of disease progression.      Assessment and Plan:      Amadou is a 24 but that they said that there was not anything more there and they wanted me to see him and I said sureyo with a h/o Hodgkins lymphoma, s/p NMA allo sib, relapsed; S/P brentuximab 16 cycles.     1. Hodgkin's lymphoma: CTs showed continued CR.  As per our previous discussions with the patient we will repeat his CT is once again at 5 years from the completion of brentuximab, that was in May 2015.  Our original plan is to only follow him clinically here after.  I will further discuss that with our team and see if there are other more updated recommendations.     2. BMT: s/p NMA allo sib PBSCT for Hodgkin lymphoma, protocol 2001-10. Previous auto PBSCT in August 2012. Relapsed 5/6/14. In CR as of Nov 2016 CT again as above.     3. Heme: His ferritin is 500.  There is no indication for additional phlebotomies.  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 Exjade caused profound neutropenia when given during brentuximab therapy.     4. ID: He has completed his vaccinations schedules.  Immune to varicella.  Recommended flu shot in the fall.     5. Liver: normal LFts. Off phlebotomies.  - Gilbert's: Intermittently has elevated bilirubin. Bilirubin slightly elevated today    6. FEN: Creat, lytes wnl. Eating well.     7. GVHD/Skin: Normal liver function tests.  These abnormalities were possibly related to iron overload.  Discontinued Gengraf 2/11/14    8. Arachnoid cyst:  only return to neurosurgery if issues    9. Lae effect: Today we had a discussion about late effects after bone marrow transplant and I recommended a meeting with our late effects clinic.  He was referred to the clinic that we will provide him with a detailed note to be implemented by his primary care physician, Dr. Oviedo.  The referral has been made and he will be called once the clinic reopens after the interruption because of COVID-19.    Plan:   He is now ~ 5 yr from completing brentuximab  Repeat Scans in 1 yr  Flu shot in the fall  Continue primary care MD for screen for skin and oral cancer at least yearly, needs to yearly eye exams, thyroid function, cholesterol, CAD screening, bone health (vit D and calcium). Dentist visit at least once yearly.  Referral to late effects BMT Clinic ([facility])          Again, thank you for allowing me to participate in the care of your patient.        Sincerely,        Sage Orellana MD

## 2021-01-15 ENCOUNTER — HEALTH MAINTENANCE LETTER (OUTPATIENT)
Age: 31
End: 2021-01-15

## 2021-03-19 ENCOUNTER — IMMUNIZATION (OUTPATIENT)
Dept: PEDIATRICS | Facility: CLINIC | Age: 31
End: 2021-03-19
Payer: COMMERCIAL

## 2021-03-19 PROCEDURE — 91300 PR COVID VAC PFIZER DIL RECON 30 MCG/0.3 ML IM: CPT

## 2021-03-19 PROCEDURE — 0001A PR COVID VAC PFIZER DIL RECON 30 MCG/0.3 ML IM: CPT

## 2021-04-09 ENCOUNTER — IMMUNIZATION (OUTPATIENT)
Dept: PEDIATRICS | Facility: CLINIC | Age: 31
End: 2021-04-09
Attending: INTERNAL MEDICINE
Payer: COMMERCIAL

## 2021-04-09 PROCEDURE — 91300 PR COVID VAC PFIZER DIL RECON 30 MCG/0.3 ML IM: CPT

## 2021-04-09 PROCEDURE — 0002A PR COVID VAC PFIZER DIL RECON 30 MCG/0.3 ML IM: CPT

## 2021-09-05 ENCOUNTER — HEALTH MAINTENANCE LETTER (OUTPATIENT)
Age: 31
End: 2021-09-05

## 2022-02-20 ENCOUNTER — HEALTH MAINTENANCE LETTER (OUTPATIENT)
Age: 32
End: 2022-02-20

## 2022-10-23 ENCOUNTER — HEALTH MAINTENANCE LETTER (OUTPATIENT)
Age: 32
End: 2022-10-23

## 2022-12-10 ENCOUNTER — HEALTH MAINTENANCE LETTER (OUTPATIENT)
Age: 32
End: 2022-12-10

## 2023-04-11 ENCOUNTER — ONCOLOGY SURVIVORSHIP VISIT (OUTPATIENT)
Dept: TRANSPLANT | Facility: CLINIC | Age: 33
End: 2023-04-11
Attending: INTERNAL MEDICINE
Payer: COMMERCIAL

## 2023-04-11 DIAGNOSIS — Z91.89 AT HIGH RISK FOR OSTEOPOROSIS: ICD-10-CM

## 2023-04-11 DIAGNOSIS — Z92.21 STATUS POST CHEMOTHERAPY: ICD-10-CM

## 2023-04-11 DIAGNOSIS — N46.9 MALE FERTILITY PROBLEMS: ICD-10-CM

## 2023-04-11 DIAGNOSIS — Z94.81 S/P ALLOGENEIC BONE MARROW TRANSPLANT (H): Primary | ICD-10-CM

## 2023-04-11 LAB
ALBUMIN SERPL BCG-MCNC: 4.8 G/DL (ref 3.5–5.2)
ALP SERPL-CCNC: 82 U/L (ref 40–129)
ALT SERPL W P-5'-P-CCNC: 52 U/L (ref 10–50)
AST SERPL W P-5'-P-CCNC: 40 U/L (ref 10–50)
BILIRUB DIRECT SERPL-MCNC: 0.32 MG/DL (ref 0–0.3)
BILIRUB SERPL-MCNC: 1.5 MG/DL
CREAT SERPL-MCNC: 0.99 MG/DL (ref 0.67–1.17)
DEPRECATED CALCIDIOL+CALCIFEROL SERPL-MC: 27 UG/L (ref 20–75)
GFR SERPL CREATININE-BSD FRML MDRD: >90 ML/MIN/1.73M2
PROT SERPL-MCNC: 8.1 G/DL (ref 6.4–8.3)

## 2023-04-11 PROCEDURE — 82565 ASSAY OF CREATININE: CPT | Performed by: NURSE PRACTITIONER

## 2023-04-11 PROCEDURE — 82306 VITAMIN D 25 HYDROXY: CPT

## 2023-04-11 PROCEDURE — G0463 HOSPITAL OUTPT CLINIC VISIT: HCPCS

## 2023-04-11 PROCEDURE — 84403 ASSAY OF TOTAL TESTOSTERONE: CPT

## 2023-04-11 PROCEDURE — 80076 HEPATIC FUNCTION PANEL: CPT | Performed by: NURSE PRACTITIONER

## 2023-04-11 PROCEDURE — 99215 OFFICE O/P EST HI 40 MIN: CPT

## 2023-04-11 PROCEDURE — 36415 COLL VENOUS BLD VENIPUNCTURE: CPT

## 2023-04-11 RX ORDER — LEVOTHYROXINE SODIUM 50 UG/1
50 TABLET ORAL DAILY
COMMUNITY

## 2023-04-11 NOTE — NURSING NOTE
Oncology Rooming Note    April 11, 2023 8:13 AM   Amadou Landrum is a 32 year old male who presents for:    Chief Complaint   Patient presents with     Oncology Clinic Visit     Survivorship visit; hx Hodgkin's lymphoma s/p BMT     Initial Vitals: There were no vitals taken for this visit. Estimated body mass index is 27.33 kg/m  as calculated from the following:    Height as of 7/19/19: 1.829 m (6').    Weight as of 8/14/20: 91.4 kg (201 lb 8 oz). There is no height or weight on file to calculate BSA.  Data Unavailable Comment: Data Unavailable   No LMP for male patient.  Allergies reviewed: Yes  Medications reviewed: Yes    Medications: Medication refills not needed today.  Pharmacy name entered into [employer]:    [facility] - [place], [place] - [address] [id number] - [place], [place] - [address]    Clinical concerns: None.     Adamaris Ontiveros RN

## 2023-04-11 NOTE — NURSING NOTE
Labs drawn via venipuncture per orders from Glenna Sandhu CNP. Pt tolerated lab draw without incident.     Adamaris Ontiveros RN

## 2023-04-11 NOTE — PROGRESS NOTES
BMT 1-Year Post-Allogeneic BMT   Survivorship Care Plan    Date: April 11, 2023    Treatment Team:   Patient Care Team:  Dorothy Mojica MD as PCP - General  Glenna Sandhu APRN CNP as Nurse Practitioner (Hematology & Oncology)  Renee Hoffmann MD as Resident (Internal Medicine)    Date of Transplant: 9/6/2013    Treatment Summary:   Amadou is a 31 yo s/p NMA allo sib PBSCT for Hodgkin lymphoma, with relapsed disease, on brentuximab with complete response to therapy based on PET/CT findings.  A brief history of his cancer treatment is outlined here. 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 He had relapsed Hodgkin's lymphoma 2014 and was treated with 16 cycles of brentuximab, which was completed MAY 2015. He remains in [facility].  Amadou was seen for a transplant-late effects/survivorship visit in the BMT clinic today. States he is doing well and denied any concerns or complaints.     General Health Maintenance:   Call the BMT clinic if you develop a skin rash, oral sores, eye pain or excessive dryness, shortness of breath, new cough, bleeding, diarrhea, nausea, or vomiting.   Vaccinations should be given at 1 and 2 years after your transplant, these may be given at your annual anniversary visits in the BMT clinic, by your primary care provider, or your local oncologist. An exception is the influenza vaccine, which can be given after day +60 post-transplant during influenza season. See table below for more information.   You can receive the COVID19 vaccine if you have not already, for more information on how to sign up for an appointment you can the [employer] vaccine website at [email or web address] or the        [employer] Vaccine Connector portal at https://[place].gov/covid19/vaccine/connector/connector.jsp  For general health concerns you can be seen by your primary care provider.   If you have  questions about your transplant or follow up tests contact your BMT RN coordinator.      Vaccine Administration Schedule       Vaccinations Post-BMT: Please refer to our [facility] BMT Vaccination Guidelines updated in March of 2021.         Mcutm-va-Epap-Disease Screening:    Has Amadou Landrum been diagnosed with chronic GVHD?  No    Survivorship Care Plan:     This individualized care plan is designed to inform you and your healthcare team of the recommended follow-up visits, tests, health maintenance activities, and cancer screening you should receive after transplant.     Immune System:  Risks Preventative Measures Recommendations   Infections, viral reactivations Continue to take prescribed medications to prevent infection if you are treated for kewrx-qo-rupr disease  Symptoms of a cold such as fever, cough, congestion, and shortness of breath should be reported to your primary care provider  Immunizations will be administered at 1 & 2 years after transplant. See schedule above. Vaccines at anniversary visit next week     Eyes:   Risks Preventative Measures Recommendations   Cataracts, dry eyes, GVH of the eyes, viral infections, and other eye changes Yearly eye exam   Screening and treatment for high blood pressure or diabetes  Call if you have eye pain, visual changes, or floaters immediately  Call If you are experiencing dry eyes and symptoms do not improve with Refresh eye drops  Patient will schedule an annual routine exam with community ophthalmologist     Mouth:  Risks Preventative Measures Recommendations   Dry mouth, oral GVH, cavities, and oral cancer Report any new symptoms such as dry mouth or painful sores   Get a dental checkup every year and a cleaning every 6 months None at this time, patient has dental provider and will schedule routine exam       Lungs  Risks Preventative Measures Recommendations   Changes in function from chemotherapy or radiation; lung infections (pneumonia) Tell your  provider about difficulty breathing, a cough, or new shortness of breath   Avoid use of tobacco products or smoking  Routine lung exams   Pulmonary Function Tests (Recommended annually post-transplant)     Placed order for a PFT to be completed within the next 2 months.       Heart and Blood Vessels  Heart Disease Risk Score: The ASCVD Risk score (Ksenia PEREZ, et al., 2019) failed to calculate for the following reasons:    The 2019 ASCVD risk score is only valid for ages 40 to 79  Risks Preventative Measures Recommendations   Damage from chemotherapy and/or radiation; early development of heart valve disease  Ask your provider if you should receive consultation from a cardiologist (heart doctor) or have special screening  Follow a  heart healthy  lifestyle. For more information visit the National Heart, Lung, and Blood [place] website at: https://www.nhlbi.nih.gov/health/health-topics/topics/heart-healthy-lifestyle-changes   Don't smoke. If you currently smoke and are ready to quit, we can help you find ways to quit  Maintain a healthy weight  Exercise regularly  Avoid foods that have high amounts of:  Salt/sodium (less than 2,300 mg of sodium per day)  Saturated and trans fats  Limit alcohol to less than 1 drink for women and 2 drinks for men per day  Sugar such as soft drinks or sugary snacks Fasting Lipid Profile or Direct LDL (Recommended annually)    1/17/2023 Triglycerides were elevated at 177 and HDL was low at 31. States he was advised to incorporate lifestyle interventions.      Hormones  Risks Preventative Measures Recommendations   Low thyroid function, low function of other glands (adrenals and others) Certain endocrine/hormone disorders are more common after transplant. For this reason, talk to your provider about:  Fatigue  Muscle weakness  Changes in cold tolerance  Reduced interest in sex  Erectile dysfunction   Certain tests may be used to monitor for hormone changes if you are experiencing  symptoms. These tests are done at 1 year  If you have been on steroids you will need to slowly taper or decrease the dose as recommended by your provider/pharmacist. Do not stop taking steroids without consulting with your provider.   If you have been on steroids in the past, you may need steroids during periods of illness TSH with T4 reflex (Recommended 1 & 2 years post-transplant), Testosterone/FSH/LH (Men, Ordered based on symptoms), Fasting Glucose (Recommended 6 mos & annually post-transplant) and Hgb A1C (Recommended annually post-transplant)    Placed order for Testosterone test to assess level. He recently had his TSH checked and it was 5.60 (H) for the total. He is going to make an appointment with his primary in 1 month to re-check the TSH.     Liver:  Risks Preventative Measures Recommendations      Damage from chemotherapy or other drugs, buildup of iron from blood transfusions, infections, and GVHD   Certain tests may be ordered at your next survivorship visit to evaluate liver function based on your individual risk factors.  Talk to your provider before taking herbal supplements or over the counter drugs like Tylenol.  Ask your doctor if you should be treated for iron overload  Avoid alcohol in excess     Hepatic Panel/LFTs (Recommended every 3-6 mos the 1st year post-transplant & annually)    We do not currently have a LFT (liver function test) on file from the last 5 years. Recommended he have a Cr checked with his next labs, and annually going forward.      Kidneys and Bladder:  Risks Preventative Measures Recommendations   Damage from chemotherapy or other drugs, infections, high blood pressure Monitoring blood tests of kidney function during follow up visits  Treating high blood pressure and diabetes   Drink adequate amounts of water  Report symptoms of infection such as frequent urination, pain with urination, foul odor to urine, or blood in the urine  Talk to your provider before taking herbal  supplements or over the counter drugs like Ibuprofen Serum creatinine checked as part of anniversary labs or at least annually by primary provider    We do not currently have a serum creatnine on file from the last 5 years. Recommended he have a Cr checked with his next labs, and annually going forward.      Nervous System:  Risks Preventative Measures Recommendations   Neuropathy from chemotherapy, changes in cognitive function Report changes in sensation or discomfort in feet or hands  Tell your provider about ongoing changes in memory, ability to concentrate, or inability to make decisions   None at this time     Mild intention tremor when reaching across the body and very mild PN bilateral upper and lower digits. Sensation is mild tingling, no pain or discomfort.        Muscle & Connective Tissue  Risks Preventative Measures Recommendations   Reduced muscle strength, reduced stamina, GVHD causing hardening of muscle tissues (scleroderma) or inflammation of tissue over muscles (fascitis)  Let your provider know if:  You notice changes in your muscle strength  Require extra assistance with daily activities  Experience pain or difficult bending or moving joints  If you have been on steroids and are experiencing weakness particularly when standing up from a chair   Need help creating an exercise routine  Notice reduced range of motion of the arms, hips, or legs  Follow general guidelines for physical activity as recommended by the Office of Disease Prevention & Health Promotion:    Avoid Inactivity  Some physical activity is better than none -- any amount has benefits.    Do Aerobic Activity  Do aerobic physical activity in episodes of at least 10 minutes, as many times as possible per day. This could include going for walks or using the elliptical or stationary bike.  Ask your doctor what aerobic activities would be safe and helpful for you, and set a goal for yourself!    Strengthen Muscles  Do  muscle-strengthening activities (such as lifting light weights or using resistance bands and/or going up and down stairs) that are moderate or high intensity and involve all major muscle groups at least 4 days a week. Calcium & Vitamin D Levels (Recommended annually)     Order placed for a Vitamin D level to be checked. He is at slightly higher risk for osteopenia/osteoperosis post transplant. Recommend considering a DXA at the age of 40 to assess risk.   Center for Disease Control and Prevention. (2017). Assessment: 30-Second Chair Stand. Retrieved online www.cdc.gov/steadi.    Emotional Health  Risks Preventative Measures Recommendations   Stress, depression, anxiety Talk to your provider about:  Changes in feelings, mood, or emotional wellbeing  Interest in support groups  If you are concerned about your caregivers emotional wellbeing  Desire to speak with a counselor for ongoing support  None at this time       Sexual Health  Risks Preventative Measures Recommendations   Reduced libido due to hormonal changes, erectile dysfunction, vaginal dryness, vaginal hunqz-xm-dexj disease, vaginal infections, sexually transmitted diseases Talk to your provider about:  Reduced libido or concerns regarding your sexual health  Men: Erectile dysfunction   Women: Vaginal dryness, pain during intercourse, vaginal bleeding after intercourse, changes in vaginal discharge that may indicate infection (green/white/foul odor)   General Recommendations:  It is safe to have sex if your platelet count is > 50,000 and you feel physically and emotionally ready   Women can use water-based lubricants to reduce discomfort from dryness. Prescription topical estrogen may help as well.  Use barrier protection such as condoms to prevent sexually transmitted diseases, you are at higher risk for infections due to a weakened immune system  For more information check out the National [employer] web page on this topic:   https://bethematch.org/patients-and-families/life-after-transplant/coping-with-life-after-transplant/relationships-and-sexual-health/  None at this time         Fertility (delete if N/A)  Risks Preventative Measures Recommendations   Difficulty with sexual intercourse; difficulty having a baby Women should avoid becoming pregnant for 2 years  Birth control should be used to prevent pregnancy  If you are interested in having a baby, discuss this with your provider Other recommend patient have a seman analysis to determine if there is any preserved seman function. This can be coordinated through his PCP.       Cancer Screening:  Risks Preventative Measures Recommendations   Higher risk for the development of solid tumors, PTLD, and blood cancers Preform a full self skin exam monthly to assess for any changes in moles or signs of skin cancer  Minimize excessive sun exposure and wear sunscreen  Screening for colorectal cancer should begin at age 50.   Men should perform a monthly testicular self-exam if they have been exposed to radiation as part of their cancer treatment.    Other routinly has full skin exam by a dermatologist.         Recommended Tests & Follow-Up:  Referrals & Tests Ordered Today:  Your BMT physician will review these tests and referral results. If you require treatment based on the results, a member of the BMT team will contact you.  Orders placed today:  Orders Placed This Encounter   Procedures     Testosterone     Vitamin D panel     Hepatic panel     Creatinine     General PFT Lab (Please always keep checked)     Pulmonary Function Test     (Note to providers: After signing orders use the refresh tool in the note window to display results)   Future Tests/Referrals:   All recommendations above should be completed within 2 months either by your primary care provider or local oncologist (cancer doctor).   Recommendations that were not ordered today should be completed by your:  Primary Care  Provider   Follow Up Care:  Continue to see your care team members routinely after transplant.  BMT Provider: Please call to schedule a follow up appointment in 2025. You can all the main line to schedule this appointment at [phone].    Primary Care Provider: Continue routine follow up with your primary care provider. Bring a copy of this survivorship care plan with you to your next visit.     MOHAMUD Seay CNP    I spent 45 minutes with the patient, over half of which was spent discussing preventative care strategies, self-management practices, and potential complications after transplant.      Information used for these recommendations was obtained from: KIRSTEN Kay., MART Narayan., MEKA Varela., SOO Chao., ANTONIA Franco., Shavonne, JДмитрий L.,   Kody, K. M. (2013). Prevalence of Hematopoietic Cell Transplant Survivors in the [place]. Biology of Blood and Marrow Transplantation?: Journal of the American Society for Blood and Marrow Transplantation, 19(10), 0595-0330. doi 10.1016/j.bbmt.2013.07.020

## 2023-04-12 LAB — TESTOST SERPL-MCNC: 403 NG/DL (ref 240–950)

## 2025-03-23 ENCOUNTER — HEALTH MAINTENANCE LETTER (OUTPATIENT)
Age: 35
End: 2025-03-23